# Patient Record
Sex: MALE | Race: WHITE | NOT HISPANIC OR LATINO | Employment: OTHER | URBAN - METROPOLITAN AREA
[De-identification: names, ages, dates, MRNs, and addresses within clinical notes are randomized per-mention and may not be internally consistent; named-entity substitution may affect disease eponyms.]

---

## 2017-01-05 ENCOUNTER — ALLSCRIPTS OFFICE VISIT (OUTPATIENT)
Dept: OTHER | Facility: OTHER | Age: 67
End: 2017-01-05

## 2017-07-06 ENCOUNTER — ALLSCRIPTS OFFICE VISIT (OUTPATIENT)
Dept: OTHER | Facility: OTHER | Age: 67
End: 2017-07-06

## 2017-12-21 ENCOUNTER — ALLSCRIPTS OFFICE VISIT (OUTPATIENT)
Dept: OTHER | Facility: OTHER | Age: 67
End: 2017-12-21

## 2017-12-21 DIAGNOSIS — I25.10 ATHEROSCLEROTIC HEART DISEASE OF NATIVE CORONARY ARTERY WITHOUT ANGINA PECTORIS: ICD-10-CM

## 2017-12-22 NOTE — PROGRESS NOTES
Assessment   Assessed    1  Atherosclerosis of native coronary artery without angina pectoris (414 01) (I25 10)   2  Benign essential hypertension (401 1) (I10)   3  Hypercholesterolemia (272 0) (E78 00)   4  Tobacco use (305 1) (Z72 0)   5  Heart murmur previously undiagnosed (785 2) (R01 1)    Plan   Atherosclerosis of native coronary artery without angina pectoris    · ECHO COMPLETE WITH CONTRAST IF INDICATED; Status:Hold For - Scheduling;    Requested for:06Rfh1356;    Perform:[facility] Radiology; Due:54Lvl7647; Ordered; For:Atherosclerosis of native coronary artery without angina pectoris; Ordered By:Sakshi datapine;  Atherosclerosis of native coronary artery without angina pectoris, Heart murmur    previously undiagnosed    · Follow-up visit in 6 months Evaluation and Treatment  Follow-up  Status: Hold For -    Scheduling  Requested for: 20Ghi7345   Ordered; For: Atherosclerosis of native coronary artery without angina pectoris, Heart murmur previously undiagnosed; Ordered By: Arlet Morejon Performed:  Due: 70GBW4596    Discussion/Summary   Cardiology Discussion Summary Free Text Note Form  [place]:    Patient is cardiovascularly stable  Maintained on amlodipine, ASA, benazepril, diltiazem, rosuvastatin  Will order ECHO to assess EF and regional wall motion abnormalities as patient has CAD and a murmur on exam  Next visit, will consider stress test  F/u with PCP  f/u with Dr Omega Mclaughlin in 6 months  All questions answered  studies reviewed with patient, medications reviewed and possible side effects discussed  Continue risk factor modification  All questions answered  Safety measures reviewed  Patient advised to report any problems prompting medical attention  Return for follow up visit in 6 months or earlier if needed with multiple medical problems who seems to be doing reasonably well from cardiac standpoint  Previous studies reviewed with patient  Medications reviewed and possible side effects discussed  concepts of cardiovascular disease , signs and symptoms of heart disease  Dietary and risk factor modification reinforced  All questions answered  Safety measures reviewed  Patient advised to report any problems prompting medical attention  Goals and Barriers: The patient has the current Goals: Stop smoking, medical management, ECHO  The patent has the current Barriers: None  Patient's Capacity to Self-Care: Patient is able to Self-Care  Patient Education: Educational resources provided: Stop smoking  Medication SE Review and Pt Understands Tx: Possible side effects of new medications were reviewed with the patient/guardian today  The treatment plan was reviewed with the patient/guardian  The patient/guardian understands and agrees with the treatment plan             Counseling Documentation With Imm: The patient was counseled regarding diagnostic results,-- instructions for management,-- risk factor reductions,-- prognosis,-- patient and family education,-- risks and benefits of treatment options,-- importance of compliance with treatment  Chief Complaint   Chief Complaint Free Text Note Form: followup visit    Chief Complaint Chronic Condition [facility]: Patient is here today for follow up of chronic conditions described in HPI  History of Present Illness   Cardiology [facility] Free Text Note Form [facility]: Patient with history of CAD, HTN, HLD, tobacco abuse presents for follow up visit  He is feeling well  Denies chest pain, dizziness, lightheadedness, SOB, palpitations  He is compliant on all medications  He still smokes  Review of Systems   Cardiology Male ROS:         Cardiac: No complaints of chest pain, no palpitations, no fainiting -- and-- as noted in HPI  Skin: No complaints of nonhealing sores or skin rash        Genitourinary: No complaints of recurrent urinary tract infections, frequent urination at night, difficult urination, blood in urine, kidney stones, loss of bladder control, no kidney or prostate problems, no erectile dysfunction  Psychological: No complaints of feeling depressed, anxiety, panic attacks, or difficulty concentrating  General: No complaints of trouble sleeping, lack of energy, fatigue, appetite changes, weight changes, fever, frequent infections, or night sweats  Respiratory: No complaints of shortness of breath, cough with sputum, or wheezing  HEENT: No complaints of serious problems, hearing problems, nose problems, throat problems, or snoring  Gastrointestinal: No complaints of liver problems, nausea, vomiting, heartburn, constipation, bloody stools, diarrhea, problems swallowing, adbominal pain, or rectal bleeding  Hematologic: No complaints of bleeding disorders, anemia, blood clots, or excessive brusing  Neurological: No complaints of numbness, tingling, dizziness, weakness, seizures, headaches, syncope or fainting, AM fatigue, daytime sleepiness, no witnessed apnea episodes  Musculoskeletal: No complaints of arthritis, back pain, or painfull swelling  ROS Reviewed:    ROS reviewed  Active Problems   Problems    1  Atherosclerosis of native coronary artery without angina pectoris (414 01) (I25 10)   2  Benign essential hypertension (401 1) (I10)   3  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 (402 11,428 0)     (I11 0,I50 9)   4  Chronic obstructive pulmonary disease (496) (J44 9)   5  Dyspnea on exertion (786 09) (R06 09)   6  Hypercholesterolemia (272 0) (E78 00)   7  Hyperlipidemia (272 4) (E78 5)   8  Muscle cramps (729 82) (R25 2)   9  Tobacco use (305 1) (Z72 0)    Past Medical History   Problems    1  History of Asbestosis (0681 660 48 54) (Ruthann Alves)   2  History of shortness of breath (V13 89) (H48 399)   3  Old myocardial infarction (412) (I25 2)   4  History of Pneumonia (V12 61)  Active Problems And Past Medical History Reviewed:     The active problems and past medical history were reviewed and updated today  Surgical History   Problems    1  History of Cardiac Cath Procedure Outcome: Successful   2  History of Cath Stent 1 Assessment   3  History of Cath Stent Placement   4  History of PTCA  Surgical History Reviewed: The surgical history was reviewed and updated today  Family History   Family History    1  No pertinent family history  Family History Reviewed: The family history was reviewed and updated today  Social History   Problems    · Denied: History of Alcohol Use (History)   · Daily Coffee Consumption - Eight Or More Cups A Day   · Occupation: Retired   · Smoking Cigarettes - Heavy (20-25 / Day)   · Tobacco use (305 1) (Z72 0)  Social History Reviewed: The social history was reviewed and updated today  The social history was reviewed and is unchanged  Current Meds    1  AmLODIPine Besylate 10 MG Oral Tablet; TAKE 1 TABLET EVERY MORNING; Therapy: 35Onr9178 to Recorded   2  Medina Aspirin EC Low Dose 81 MG Oral Tablet Delayed Release; TAKE 1 TABLET DAILY; Therapy: 61JBL0786 to (Evaluate:52Vkp5897); Last Rx:05Jan2017 Ordered   3  Benazepril HCl - 40 MG Oral Tablet; TAKE 1 TABLET DAILY; Therapy: 55TWV3993 to (Mahamed Velazquez)  Requested for: 48TRY8766; Last     Rx:30Mar2015 Ordered   4  Combivent Respimat  MCG/ACT Inhalation Aerosol Solution Recorded   5  DilTIAZem HCl ER Coated Beads 300 MG Oral Capsule Extended Release 24 Hour;     TAKE 1 CAPSULE DAILY; Therapy: 75DJC8739 to Recorded   6  Rosuvastatin Calcium 20 MG Oral Tablet; TAKE 1 TABLET DAILY; Therapy: 82BGU1312 to (Evaluate:23Dxn3945); Last Rx:05Jan2017 Ordered  Medication List Reviewed: The medication list was reviewed and updated today  Allergies   Medication    1  No Known Drug Allergies  Non-Medication    2   Seasonal    Vitals   Vital Signs    Recorded: 21Dec2017 08:56AM   Heart Rate 70   Systolic 425   Diastolic 72   Height 6 ft 2 in   Weight 239 lb    BMI Calculated 30 69   BSA Calculated 2 34   O2 Saturation 91     Physical Exam        Constitutional      General appearance: No acute distress, well appearing and well nourished  Eyes      Conjunctiva and Sclera examination: Conjunctiva pink, sclera anicteric  Ears, Nose, Mouth, and Throat - Oropharynx: Clear, nares are clear, mucous membranes are moist       Neck      Neck and thyroid: Normal, supple, trachea midline, no thyromegaly  Pulmonary      Respiratory effort: No increased work of breathing or signs of respiratory distress  Auscultation of lungs: Clear to auscultation, no rales, no rhonchi, no wheezing, good air movement  Cardiovascular      Auscultation of heart: Abnormal  -- 2/6 systolic ejection murmur right sternal border  Carotid pulses: Normal, 2+ bilaterally  Peripheral vascular exam: Normal pulses throughout, no tenderness, erythema or swelling  Pedal pulses: Normal, 2+ bilaterally  Examination of extremities for edema and/or varicosities: Normal        Abdomen      Abdomen: Non-tender and no distention  Liver and spleen: No hepatomegaly or splenomegaly  Musculoskeletal Gait and station: Normal gait  -- Digits and nails: Normal without clubbing or cyanosis  -- Inspection/palpation of joints, bones, and muscles: Normal, ROM normal        Skin - Skin and subcutaneous tissue: Normal without rashes or lesions  Skin is warm and well perfused, normal turgor  Neurologic - Cranial nerves: II - XII intact  -- Speech: Normal        Psychiatric - Orientation to person, place, and time: Normal -- Mood and affect: Normal       Attending Note   Collaborating Physician Note: Collaborating Physician: I interviewed and examined the patient,-- I supervised the Advanced Practitioner-- and-- I agree with the Advanced Practitioner note        Signatures    Electronically signed by : Omar Chawla [facility]; Dec 21 2017  9:20AM EST (Author)     Electronically signed by : VITALY De Luna ; Dec 21 2017 10:15AM EST                       (Author)

## 2018-01-12 VITALS
SYSTOLIC BLOOD PRESSURE: 146 MMHG | HEIGHT: 74 IN | HEART RATE: 60 BPM | WEIGHT: 237 LBS | OXYGEN SATURATION: 92 % | DIASTOLIC BLOOD PRESSURE: 86 MMHG | BODY MASS INDEX: 30.42 KG/M2

## 2018-01-12 VITALS
BODY MASS INDEX: 30.42 KG/M2 | DIASTOLIC BLOOD PRESSURE: 74 MMHG | HEART RATE: 66 BPM | SYSTOLIC BLOOD PRESSURE: 124 MMHG | HEIGHT: 74 IN | WEIGHT: 237 LBS | OXYGEN SATURATION: 92 %

## 2018-01-15 NOTE — RESULT NOTES
Verified Results  NM MYOCARDIAL PERFUSION SPECT (RX STRESS AND/OR REST) [id number] 07:46AM Breonna He     Test Name Result Flag Reference   NM MYOCARDIAL PERFUSION SPECT (RX STRESS AND/OR REST) (Report)     [facility] [address], [address] [place] [address]   [phone]     Rest/Stress Gated SPECT Myocardial Perfusion Imaging After Regadenoson     Patient: Milo Allen   MR number: [id number]   Account number: [de-identified]   : 1950   Age: 77 years   Gender: Male   Status: Outpatient   Location: [facility]   Height: 74 in   Weight: 237 lb   BP: 150/ 76 mmHg     Allergies: ALLERGIES NOT ON FILE     Diagnosis: R06 02 - Shortness of breath     Primary Physician: Martita Wild MD   Referring Physician: Ally Marie MD   Technician: Tedi Cogan   Group: [facility]   Interpreting Physician: Ally Marie MD     INDICATIONS: Shortness of Breath, PEREZ, CAD     HISTORY: The patient is a 77year old  male  Chest pain status: no   chest pain  Other symptoms: dyspnea  Coronary artery disease risk factors:   dyslipidemia, hypertension, and smoking  Cardiovascular history: coronary   artery disease and prior myocardial infarction  Prior cardiovascular   procedures: percutaneous transluminal coronary angioplasty  Medications: a   calcium channel blocker, aspirin, a lipid lowering agent, and an   antihypertensive agent  Previous test results: abnormal nuclear study  REST ECG: Normal sinus rhythm  Sinus bradycardia  Nonspecific ST and T wave   abnormalities were present  PROCEDURE: The study was performed at [address] [place] [address]  A   regadenoson infusion pharmacologic stress test was performed  Gated SPECT   myocardial perfusion imaging was performed after stress and at rest  Systolic   blood pressure was 150 mmHg, at the start of the study  Diastolic blood   pressure was 76 mmHg, at the start of the study  The heart rate was 59 bpm, at   the start of the study  Regadenoson protocol:   HR bpm SBP mmHg DBP mmHg Rhythm/conduct   Baseline 59 150 76 occasional PVC's   Immediate 67 -- -- --   1 min 73 164 70 --   2 min 67 -- -- --   3 min 67 -- -- --   4 min 65 150 70 --     STRESS SUMMARY: Duration of pharmacologic stress was 5 min  Maximal heart rate   during stress was 73 bpm  The rate-pressure product for the peak heart rate and   blood pressure was 63865  There was no chest pain during stress  The stress   test was terminated due to protocol completion  The stress ECG was negative for   ischemia  Arrhythmia during stress: isolated premature ventricular beats  ISOTOPE ADMINISTRATION:   Resting isotope administration Stress isotope administration   Agent Tetrofosmin Tetrofosmin   Dose 9 77 mCi 31 17 mCi   Date 02/02/2016 02/02/2016   Injection time 07:19 09:25   Injection-image interval 45 min 30 min     MYOCARDIAL PERFUSION IMAGING:   The image quality was fair  Rotating projection images reveal mild   diaphragmatic attenuation  Left ventricular size was normal      GATED SPECT:   The calculated left ventricular ejection fraction was 57 %  UWA=028   ESV=50   TID=1 59 There was no left ventricular regional abnormality  SUMMARY:   - Procedure information: 30 min   - Stress results: There was no chest pain during stress  - ECG conclusions: The stress ECG was negative for ischemia    - Gated SPECT: The calculated left ventricular ejection fraction was 57 %  BIT=054   ESV=50   TID=1 59 There was no left ventricular regional abnormality  IMPRESSIONS: No evidence of ischemia  Small fixed inferior defect likely c/w   diaphragmatic attenuation   Left ventricular systolic function was normal      Prepared and signed by     Hugo Cary MD   Signed 02/03/2016 15:02:06

## 2018-01-23 VITALS
DIASTOLIC BLOOD PRESSURE: 72 MMHG | WEIGHT: 239 LBS | OXYGEN SATURATION: 91 % | HEART RATE: 70 BPM | HEIGHT: 74 IN | BODY MASS INDEX: 30.67 KG/M2 | SYSTOLIC BLOOD PRESSURE: 130 MMHG

## 2018-06-14 RX ORDER — ATORVASTATIN CALCIUM 20 MG/1
20 TABLET, FILM COATED ORAL DAILY
COMMUNITY
Start: 2017-12-11 | End: 2021-04-15 | Stop reason: SDUPTHER

## 2018-06-14 RX ORDER — AMLODIPINE BESYLATE 10 MG/1
10 TABLET ORAL DAILY
COMMUNITY
Start: 2017-12-11 | End: 2021-04-15 | Stop reason: SDUPTHER

## 2018-06-14 RX ORDER — MOMETASONE FUROATE 1 MG/G
CREAM TOPICAL
COMMUNITY
Start: 2017-12-11 | End: 2019-12-06

## 2018-06-14 RX ORDER — DILTIAZEM HYDROCHLORIDE 300 MG/1
300 CAPSULE, COATED, EXTENDED RELEASE ORAL DAILY
COMMUNITY
Start: 2017-12-11 | End: 2021-04-15 | Stop reason: SDUPTHER

## 2018-06-14 RX ORDER — BENAZEPRIL HYDROCHLORIDE 40 MG/1
20 TABLET, FILM COATED ORAL DAILY
COMMUNITY
Start: 2014-04-16 | End: 2021-04-15 | Stop reason: SDUPTHER

## 2018-06-14 RX ORDER — ALBUTEROL SULFATE 90 UG/1
2 AEROSOL, METERED RESPIRATORY (INHALATION) EVERY 4 HOURS
COMMUNITY
Start: 2017-12-11 | End: 2020-07-24

## 2018-06-14 RX ORDER — ASPIRIN 81 MG
1 TABLET, DELAYED RELEASE (ENTERIC COATED) ORAL DAILY
COMMUNITY
End: 2021-04-02 | Stop reason: ALTCHOICE

## 2018-06-14 RX ORDER — ROSUVASTATIN CALCIUM 20 MG/1
1 TABLET, COATED ORAL DAILY
COMMUNITY
Start: 2017-01-05 | End: 2019-12-06

## 2018-07-02 ENCOUNTER — HOSPITAL ENCOUNTER (OUTPATIENT)
Dept: NON INVASIVE DIAGNOSTICS | Facility: CLINIC | Age: 68
Discharge: HOME | End: 2018-07-02
Payer: MEDICARE

## 2018-07-02 DIAGNOSIS — I25.10 ATHEROSCLEROTIC HEART DISEASE OF NATIVE CORONARY ARTERY WITHOUT ANGINA PECTORIS: ICD-10-CM

## 2018-07-02 PROCEDURE — 93306 TTE W/DOPPLER COMPLETE: CPT

## 2018-07-02 PROCEDURE — 93306 TTE W/DOPPLER COMPLETE: CPT | Performed by: INTERNAL MEDICINE

## 2018-07-03 ENCOUNTER — TELEPHONE (OUTPATIENT)
Dept: CARDIOLOGY CLINIC | Facility: CLINIC | Age: 68
End: 2018-07-03

## 2018-07-05 ENCOUNTER — OFFICE VISIT (OUTPATIENT)
Dept: CARDIOLOGY CLINIC | Facility: CLINIC | Age: 68
End: 2018-07-05
Payer: MEDICARE

## 2018-07-05 ENCOUNTER — TELEPHONE (OUTPATIENT)
Dept: CARDIOLOGY CLINIC | Facility: CLINIC | Age: 68
End: 2018-07-05

## 2018-07-05 VITALS
HEART RATE: 76 BPM | WEIGHT: 232 LBS | DIASTOLIC BLOOD PRESSURE: 78 MMHG | BODY MASS INDEX: 28.85 KG/M2 | OXYGEN SATURATION: 94 % | HEIGHT: 75 IN | SYSTOLIC BLOOD PRESSURE: 142 MMHG

## 2018-07-05 DIAGNOSIS — I10 HYPERTENSION, UNSPECIFIED TYPE: ICD-10-CM

## 2018-07-05 DIAGNOSIS — Z72.0 TOBACCO USE: ICD-10-CM

## 2018-07-05 DIAGNOSIS — E78.5 HYPERLIPIDEMIA, UNSPECIFIED HYPERLIPIDEMIA TYPE: ICD-10-CM

## 2018-07-05 DIAGNOSIS — R01.1 MURMUR: ICD-10-CM

## 2018-07-05 DIAGNOSIS — I25.119 CORONARY ARTERY DISEASE WITH ANGINA PECTORIS, UNSPECIFIED VESSEL OR LESION TYPE, UNSPECIFIED WHETHER NATIVE OR TRANSPLANTED HEART (HCC): Primary | ICD-10-CM

## 2018-07-05 PROCEDURE — 99213 OFFICE O/P EST LOW 20 MIN: CPT | Performed by: INTERNAL MEDICINE

## 2018-07-05 NOTE — TELEPHONE ENCOUNTER
Labs received from [facility] done 6/5, per Dr Mil Lantigua LDL is slightly elevated at 118 but otherwise bw is ok   I tried to call home x2 but no answer or vm, left detailed message on cell-advised to cb if any questions-MS

## 2018-07-05 NOTE — PROGRESS NOTES
[facility]  [address] [address]  Cardiology Consultation     Katey Stanford  [id number]  1950      1  Coronary artery disease with angina pectoris, unspecified vessel or lesion type, unspecified whether native or transplanted heart ([facility] [address] )     2  Hypertension, unspecified type     3  Hyperlipidemia, unspecified hyperlipidemia type     4  Tobacco use     5  Murmur         Chief Complaint   Patient presents with    Follow-up       HPI:  Patient with history of CAD, HTN, HLD, tobacco use presents for follow up visit  Denies chest pain, SOB, lightheadedness, palpitations, leg swelling, syncope  Compliant on medications  Continues to smoke  There is no problem list on file for this patient  Past Medical History:   Diagnosis Date    [facility]     Heart murmur     HTN (hypertension)     Hyperlipidemia     Myocardial infarction (HCC)     SOB (shortness of breath)      Social History     Social History    Marital status:      Spouse name: N/A    Number of children: N/A    Years of education: N/A     Occupational History    Not on file       Social History Main Topics    Smoking status: Current Every Day Smoker    Smokeless tobacco: Never Used    Alcohol use No    Drug use: Unknown    Sexual activity: Not on file     Other Topics Concern    Not on file     Social History Narrative    No narrative on file      Family History   Problem Relation Age of Onset    Family history unknown: Yes     Past Surgical History:   Procedure Laterality Date    CARDIAC CATHETERIZATION      CORONARY ANGIOPLASTY         Current Outpatient Prescriptions:     albuterol (PROVENTIL HFA,VENTOLIN HFA) 90 mcg/act inhaler, Inhale 2 puffs every 4 (four) hours, Disp: , Rfl:     amLODIPine (NORVASC) 10 mg tablet, Take 10 mg by mouth, Disp: , Rfl:     aspirin (ASPIR-LOW) 81 mg EC tablet, Take 1 tablet by mouth, Disp: , Rfl:     atorvastatin (LIPITOR) 20 mg tablet, Take 20 mg by mouth, Disp: , Rfl:     benazepril (LOTENSIN) 40 MG tablet, Take 20 mg by mouth daily  , Disp: , Rfl:     diltiazem (CARTIA XT) 300 mg 24 hr capsule, Take 300 mg by mouth daily  , Disp: , Rfl:     ipratropium-albuterol (COMBIVENT RESPIMAT) inhaler, Inhale, Disp: , Rfl:     mometasone (ELOCON) 0 1 % cream, Apply topically, Disp: , Rfl:     rosuvastatin (CRESTOR) 20 MG tablet, Take 1 tablet by mouth daily, Disp: , Rfl:   Allergies   Allergen Reactions    Pollen Extract      Vitals:    07/05/18 1425   BP: 142/78   Pulse: 76   SpO2: 94%   Weight: 105 kg (232 lb)   Height: 6' 3" (1 905 m)       Labs:  No visits with results within 2 Month(s) from this visit  Latest known visit with results is:   No results found for any previous visit  Imaging: No results found  Review of Systems:  Review of Systems   Constitutional: Negative for diaphoresis, fatigue and fever  HENT: Negative for congestion, ear discharge, hearing loss, sinus pain and sore throat  Eyes: Negative for pain, redness and visual disturbance  Respiratory: Negative for cough, chest tightness, shortness of breath and wheezing  Cardiovascular: Negative for chest pain, palpitations and leg swelling  Gastrointestinal: Negative for abdominal distention, abdominal pain, constipation, diarrhea, nausea and vomiting  Endocrine: Negative for cold intolerance and heat intolerance  Genitourinary: Negative for difficulty urinating and hematuria  Musculoskeletal: Negative for arthralgias, back pain, gait problem, joint swelling, myalgias, neck pain and neck stiffness  Skin: Negative for color change, pallor, rash and wound  Neurological: Negative for dizziness, syncope, weakness, numbness and headaches  Hematological: Does not bruise/bleed easily  Psychiatric/Behavioral: Negative for agitation, behavioral problems and confusion  The patient is not nervous/anxious          /78   Pulse 76   Ht 6' 3" (1 905 m)   Wt 105 kg (232 lb)   SpO2 94%   BMI 29 00 kg/m²     Physical Exam:  Physical Exam   Constitutional: He is oriented to person, place, and time  He appears well-developed and well-nourished  HENT:   Head: Normocephalic and atraumatic  Eyes: Conjunctivae and EOM are normal  Pupils are equal, round, and reactive to light  Neck: Normal range of motion  Neck supple  Cardiovascular: Normal rate, regular rhythm, normal heart sounds and intact distal pulses  Exam reveals no gallop and no friction rub  No murmur heard  Pulmonary/Chest: Effort normal  No respiratory distress  He has wheezes  He has no rales  He exhibits no tenderness  Abdominal: Soft  Bowel sounds are normal  He exhibits no distension  There is no rebound  Musculoskeletal: Normal range of motion  He exhibits no edema  Neurological: He is alert and oriented to person, place, and time  He has normal reflexes  Skin: Skin is warm and dry  Psychiatric: He has a normal mood and affect  His behavior is normal  Judgment and thought content normal        Discussion/Summary:  1  CAD: No anginal symptoms  Continue ASA, statin,  No BB due to wheezing  Will obtain recent blood work  2  HTN: Stable, continue present regimen    3  HLD: Continue statin  Will obtain recent blood work      4  Murmur, aortic stenosis:  -ECHO shows EF 60%, no regional wall motion abnormalities, mild AS  -Will need yearly ECHOs    F/u 6 months

## 2018-09-17 ENCOUNTER — TELEPHONE (OUTPATIENT)
Dept: CARDIOLOGY CLINIC | Facility: CLINIC | Age: 68
End: 2018-09-17

## 2018-09-17 DIAGNOSIS — J30.2 SEASONAL ALLERGIC RHINITIS, UNSPECIFIED TRIGGER: Primary | ICD-10-CM

## 2018-09-17 RX ORDER — PREDNISONE 10 MG/1
10 TABLET ORAL DAILY
Qty: 15 TABLET | Refills: 0 | Status: SHIPPED | OUTPATIENT
Start: 2018-09-17 | End: 2018-12-21 | Stop reason: ALTCHOICE

## 2018-09-17 NOTE — TELEPHONE ENCOUNTER
Patient walked into [facility]  Said Dr Winsome Valdes is away and he needs his Prednisone refilled  He needs for 10mg  Said he needs it b/c he has had a sinus infection for 11 days    I told him I would ask Dr Trista Ding   Call him [phone]

## 2018-12-11 ENCOUNTER — TELEPHONE (OUTPATIENT)
Dept: CARDIOLOGY CLINIC | Facility: CLINIC | Age: 68
End: 2018-12-11

## 2018-12-11 ENCOUNTER — APPOINTMENT (EMERGENCY)
Dept: RADIOLOGY | Facility: HOSPITAL | Age: 68
End: 2018-12-11
Payer: MEDICARE

## 2018-12-11 ENCOUNTER — HOSPITAL ENCOUNTER (EMERGENCY)
Facility: HOSPITAL | Age: 68
Discharge: HOME | End: 2018-12-11
Attending: EMERGENCY MEDICINE | Admitting: EMERGENCY MEDICINE
Payer: MEDICARE

## 2018-12-11 ENCOUNTER — APPOINTMENT (EMERGENCY)
Dept: CT IMAGING | Facility: HOSPITAL | Age: 68
End: 2018-12-11
Payer: MEDICARE

## 2018-12-11 VITALS
BODY MASS INDEX: 30.15 KG/M2 | TEMPERATURE: 98 F | DIASTOLIC BLOOD PRESSURE: 93 MMHG | WEIGHT: 242.51 LBS | OXYGEN SATURATION: 90 % | HEIGHT: 75 IN | HEART RATE: 72 BPM | RESPIRATION RATE: 17 BRPM | SYSTOLIC BLOOD PRESSURE: 189 MMHG

## 2018-12-11 DIAGNOSIS — R07.9 CHEST PAIN: Primary | ICD-10-CM

## 2018-12-11 DIAGNOSIS — J44.1 COPD EXACERBATION (HCC): ICD-10-CM

## 2018-12-11 LAB
ALBUMIN SERPL BCP-MCNC: 3.7 G/DL (ref 3.5–5)
ALP SERPL-CCNC: 109 U/L (ref 46–116)
ALT SERPL W P-5'-P-CCNC: 23 U/L (ref 12–78)
ANION GAP SERPL CALCULATED.3IONS-SCNC: 10 MMOL/L (ref 4–13)
AST SERPL W P-5'-P-CCNC: 14 U/L (ref 5–45)
ATRIAL RATE: 72 BPM
ATRIAL RATE: 79 BPM
BASOPHILS # BLD AUTO: 0.05 THOUSANDS/ΜL (ref 0–0.1)
BASOPHILS NFR BLD AUTO: 1 % (ref 0–1)
BILIRUB SERPL-MCNC: 0.8 MG/DL (ref 0.2–1)
BUN SERPL-MCNC: 15 MG/DL (ref 5–25)
CALCIUM SERPL-MCNC: 9.5 MG/DL (ref 8.3–10.1)
CHLORIDE SERPL-SCNC: 107 MMOL/L (ref 100–108)
CO2 SERPL-SCNC: 25 MMOL/L (ref 21–32)
CREAT SERPL-MCNC: 1.13 MG/DL (ref 0.6–1.3)
EOSINOPHIL # BLD AUTO: 0.4 THOUSAND/ΜL (ref 0–0.61)
EOSINOPHIL NFR BLD AUTO: 5 % (ref 0–6)
ERYTHROCYTE [DISTWIDTH] IN BLOOD BY AUTOMATED COUNT: 13.3 % (ref 11.6–15.1)
GFR SERPL CREATININE-BSD FRML MDRD: 65 ML/MIN/1.73SQ M
GLUCOSE SERPL-MCNC: 107 MG/DL (ref 65–140)
HCT VFR BLD AUTO: 47.6 % (ref 36.5–49.3)
HGB BLD-MCNC: 15.7 G/DL (ref 12–17)
IMM GRANULOCYTES # BLD AUTO: 0.02 THOUSAND/UL (ref 0–0.2)
IMM GRANULOCYTES NFR BLD AUTO: 0 % (ref 0–2)
LYMPHOCYTES # BLD AUTO: 1.62 THOUSANDS/ΜL (ref 0.6–4.47)
LYMPHOCYTES NFR BLD AUTO: 19 % (ref 14–44)
MCH RBC QN AUTO: 32.5 PG (ref 26.8–34.3)
MCHC RBC AUTO-ENTMCNC: 33 G/DL (ref 31.4–37.4)
MCV RBC AUTO: 99 FL (ref 82–98)
MONOCYTES # BLD AUTO: 0.64 THOUSAND/ΜL (ref 0.17–1.22)
MONOCYTES NFR BLD AUTO: 8 % (ref 4–12)
NEUTROPHILS # BLD AUTO: 5.66 THOUSANDS/ΜL (ref 1.85–7.62)
NEUTS SEG NFR BLD AUTO: 67 % (ref 43–75)
NRBC BLD AUTO-RTO: 0 /100 WBCS
NT-PROBNP SERPL-MCNC: 350 PG/ML
PLATELET # BLD AUTO: 217 THOUSANDS/UL (ref 149–390)
PMV BLD AUTO: 11.4 FL (ref 8.9–12.7)
POTASSIUM SERPL-SCNC: 3.8 MMOL/L (ref 3.5–5.3)
PR INTERVAL: 206 MS
PR INTERVAL: 210 MS
PROT SERPL-MCNC: 7.4 G/DL (ref 6.4–8.2)
QRS AXIS: 54 DEGREES
QRS AXIS: 65 DEGREES
QRSD INTERVAL: 112 MS
QRSD INTERVAL: 114 MS
QT INTERVAL: 386 MS
QT INTERVAL: 412 MS
QTC INTERVAL: 442 MS
QTC INTERVAL: 451 MS
RBC # BLD AUTO: 4.83 MILLION/UL (ref 3.88–5.62)
SODIUM SERPL-SCNC: 142 MMOL/L (ref 136–145)
T WAVE AXIS: -5 DEGREES
T WAVE AXIS: 18 DEGREES
TROPONIN I SERPL-MCNC: <0.02 NG/ML
TROPONIN I SERPL-MCNC: <0.02 NG/ML
VENTRICULAR RATE: 72 BPM
VENTRICULAR RATE: 79 BPM
WBC # BLD AUTO: 8.39 THOUSAND/UL (ref 4.31–10.16)

## 2018-12-11 PROCEDURE — 94640 AIRWAY INHALATION TREATMENT: CPT

## 2018-12-11 PROCEDURE — 36415 COLL VENOUS BLD VENIPUNCTURE: CPT | Performed by: EMERGENCY MEDICINE

## 2018-12-11 PROCEDURE — 93010 ELECTROCARDIOGRAM REPORT: CPT | Performed by: INTERNAL MEDICINE

## 2018-12-11 PROCEDURE — 70450 CT HEAD/BRAIN W/O DYE: CPT

## 2018-12-11 PROCEDURE — 83880 ASSAY OF NATRIURETIC PEPTIDE: CPT | Performed by: EMERGENCY MEDICINE

## 2018-12-11 PROCEDURE — 84484 ASSAY OF TROPONIN QUANT: CPT | Performed by: EMERGENCY MEDICINE

## 2018-12-11 PROCEDURE — 71046 X-RAY EXAM CHEST 2 VIEWS: CPT

## 2018-12-11 PROCEDURE — 93005 ELECTROCARDIOGRAM TRACING: CPT

## 2018-12-11 PROCEDURE — 99285 EMERGENCY DEPT VISIT HI MDM: CPT

## 2018-12-11 PROCEDURE — 85025 COMPLETE CBC W/AUTO DIFF WBC: CPT | Performed by: EMERGENCY MEDICINE

## 2018-12-11 PROCEDURE — 80053 COMPREHEN METABOLIC PANEL: CPT | Performed by: EMERGENCY MEDICINE

## 2018-12-11 RX ORDER — ALBUTEROL SULFATE 2.5 MG/3ML
5 SOLUTION RESPIRATORY (INHALATION) ONCE
Status: COMPLETED | OUTPATIENT
Start: 2018-12-11 | End: 2018-12-11

## 2018-12-11 RX ORDER — ASPIRIN 81 MG/1
324 TABLET, CHEWABLE ORAL ONCE
Status: DISCONTINUED | OUTPATIENT
Start: 2018-12-11 | End: 2018-12-11 | Stop reason: HOSPADM

## 2018-12-11 RX ORDER — AZITHROMYCIN 250 MG/1
TABLET, FILM COATED ORAL
Qty: 6 TABLET | Refills: 0 | Status: SHIPPED | OUTPATIENT
Start: 2018-12-11 | End: 2018-12-15

## 2018-12-11 RX ADMIN — IPRATROPIUM BROMIDE 0.5 MG: 0.5 SOLUTION RESPIRATORY (INHALATION) at 09:59

## 2018-12-11 RX ADMIN — ALBUTEROL SULFATE 5 MG: 2.5 SOLUTION RESPIRATORY (INHALATION) at 09:59

## 2018-12-11 NOTE — DISCHARGE INSTRUCTIONS
Chest Pain, Ambulatory Care   GENERAL INFORMATION:   Chest pain  can be caused by a range of conditions, from not serious to life-threatening  It may be caused by a heart attack or a blood clot in your lungs  Sometimes chest pain or pressure is caused by poor blood flow to your heart (angina)  Infection, inflammation, or a fracture in the bones or cartilage in your chest can cause pain or discomfort  Chest pain can also be a symptom of a digestive problem, such as acid reflux or a stomach ulcer  Common symptoms include the following:   · Fever or sweating     · Nausea or vomiting     · Shortness of breath     · Discomfort or pressure that spreads from your chest to your back, jaw, or arm     · A racing or slow heartbeat     · Feeling weak, tired, or faint  Seek immediate care for the following symptoms:   · Any of the following signs of a heart attack:      ¨ Squeezing, pressure, or pain in your chest that lasts longer than 5 minutes or returns    ¨ Discomfort or pain in your back, neck, jaw, stomach, or arm     ¨ Trouble breathing     ¨ Nausea or vomiting    ¨ Lightheadedness or a sudden cold sweat, especially with trouble breathing         · Chest discomfort that gets worse, even with medicine    · Coughing or vomiting blood    · Black or bloody bowel movements     · Vomiting that does not stop, or pain when you swallow  Treatment for chest pain  may include medicine to treat your symptoms while he determines the cause of your chest pain  You may also need any of the following:  · Antiplatelets , such as aspirin, help prevent blood clots  Take your antiplatelet medicine exactly as directed  These medicines make it more likely for you to bleed or bruise  If you are told to take aspirin, do not take acetaminophen or ibuprofen instead  · Prescription pain medicine  may be given  Ask how to take this medicine safely  Do not smoke: If you smoke, it is never too late to quit   Smoking increases your risk for a heart attack and other heart and lung conditions  Ask your healthcare provider for information about how to stop smoking if you need help  Follow up with your healthcare provider as directed: You may need more tests  You may be referred to a specialist, such as a cardiologist or gastroenterologist  Write down your questions so you remember to ask them during your visits  CARE AGREEMENT:   You have the right to help plan your care  Learn about your health condition and how it may be treated  Discuss treatment options with your caregivers to decide what care you want to receive  You always have the right to refuse treatment  The above information is an  only  It is not intended as medical advice for individual conditions or treatments  Talk to your doctor, nurse or pharmacist before following any medical regimen to see if it is safe and effective for you  © 2014 [address] is for End User's use only and may not be sold, redistributed or otherwise used for commercial purposes  All illustrations and images included in CareNotes® are the copyrighted property of [employer]  or [employer]  COPD (Chronic Obstructive Pulmonary Disease)   WHAT YOU NEED TO KNOW:   What is chronic obstructive pulmonary disease (COPD)? COPD is a lung disease that makes it hard for you to breathe  It is usually a result of lung damage caused by years of irritation and inflammation in your lungs  This limits air flow in your lungs  Smoking, pollution, genetics, or a history of lung infections can increase your risk for COPD  What are the signs and symptoms of COPD? · Shortness of breath     · A dry cough     · Coughing fits that bring up mucus from your lungs     · Wheezing and chest tightness  How is COPD diagnosed? Your healthcare provider will ask about your symptoms and examine you  He or she will ask if a family member has COPD or breathing problems   He or she will ask if you are a current or former smoker  Tell your provider if you have other medical conditions, such as heart disease or asthma  Tell him or her how long you have had symptoms, what makes them worse, and how they affect your life  You may need the following:  · Lung function tests  measure the airflow in your lungs and show how well you can breathe  · Blood tests  check for infection and measure oxygen levels in your blood  · A chest x-ray  is done to check for other lung problems  · CT scan pictures  may be taken of your lungs  You may be given contrast liquid to help your lungs show up better in the pictures  Tell the healthcare provider if you have ever had an allergic reaction to contrast liquid  How is COPD treated? · Medicines  may be used to open your airways, decrease swelling and inflammation in your lungs, or treat an infection  You may need 2 or more medicines  A short-acting medicine relieves symptoms quickly  Long-acting medicines will control or prevent symptoms  Ask your healthcare provider for more information about the medicines you are given and how to use them safely  · Pulmonary rehabilitation  is a program to help you manage your symptoms and improve your quality of life  It may include nutritional counseling and exercise to strengthen your lungs  · Oxygen  may help you breathe easier and feel more alert if you have severe COPD  · Surgery  is sometimes done if all other treatments have failed  A lung reduction is surgery to remove part of your damaged lung  A lung transplant is the replacement of your lung with a donor lung  Ask your healthcare provider for more information about surgery for COPD  What are the risks of COPD? COPD raises your risk for diabetes, high blood pressure, and heart disease  Without treatment, COPD can become life-threatening  What can I do to help make breathing easier?    · Use pursed-lip breathing any time you feel short of breath  Take a deep breath in through your nose  Slowly breathe out through your mouth with your lips pursed for twice as long as you inhaled  You can also practice this breathing pattern while you bend, lift, climb stairs, or exercise  It slows down your breathing and helps move more air in and out of your lungs  · Do not smoke, and avoid others who smoke  Nicotine and other substances can cause lung irritation or damage and make it harder for you to breathe  Do not use e-cigarettes or smokeless tobacco  They still contain nicotine  Ask your healthcare provider for information if you currently smoke and need help to quit  For support and more information:  ¨ [employer]  Phone: 5- 308 - 347-7153  Web Address: [place]see      · Be aware of and avoid anything that makes your symptoms worse  Stay out of high altitudes and places with high humidity  Stay inside, or cover your mouth and nose with a scarf when you are outside during cold weather  Stay inside on days when air pollution or pollen counts are high  Do not use aerosol sprays such as deodorant, bug spray, and hair spray  How can I manage COPD and help prevent exacerbations? COPD is a serious condition that gets worse over time  A COPD exacerbation means your symptoms suddenly get worse  It is important to prevent exacerbations  An exacerbation can cause more lung damage  COPD cannot be cured, but you can take action to feel better and prevent COPD exacerbations:  · Protect yourself from germs  Germs can get into your lungs and cause an infection  An infection in your lungs can create more mucus and make it harder to breathe  An infection can also create swelling in your airways and prevent air from getting in  You can decrease your risk for infection by doing the following:     [facility] your hands often with soap and water  Carry germ-killing gel with you   You can use the gel to clean your hands when soap and water are not available  ¨ Do not touch your eyes, nose, or mouth unless you have washed your hands first      ¨ Always cover your mouth when you cough  Cough into a tissue or your shirtsleeve so you do not spread germs from your hands  ¨ Try to avoid people who have a cold or the flu  If you are sick, stay away from others as much as possible  · Drink more liquids  This will help to keep your air passages moist and help you cough up mucus  Ask how much liquid to drink each day and which liquids are best for you  · Exercise daily  Exercise for at least 20 minutes each day to help increase your energy and decrease shortness of breath  Talk to your healthcare provider about the best exercise plan for you  · Ask about vaccines  Your healthcare provider may recommend that you get regular flu and pneumonia vaccines  Pneumonia can become life-threatening for a person who has COPD  Ask about other vaccines you may need  Ask your healthcare provider about the flu and pneumonia vaccines  All adults should get the flu (influenza) vaccine every year as soon as it becomes available  The pneumonia vaccine is given to adults aged 72 or older to prevent pneumococcal disease, such as pneumonia  Adults aged 23 to 59 years who are at high risk for pneumococcal disease also should get the pneumococcal vaccine  It may need to be repeated 1 or 5 years later  Call 911 if:   · You feel lightheaded, short of breath, and have chest pain  When should I seek immediate care? · You are confused, dizzy, or feel faint  · Your arm or leg feels warm, tender, and painful  It may look swollen and red  · You cough up blood  When should I contact my healthcare provider? · You have more shortness of breath than usual      · You need more medicine than usual to control your symptoms  · You are coughing or wheezing more than usual      · You are coughing up more mucus, or it is a different color or has a different odor  · You gain more than 3 pounds in a week  · You have a fever, a runny or stuffy nose, and a sore throat, or other cold or flu symptoms  · Your skin, lips, or nails start to turn blue  · You have swelling in your legs or ankles  · You are very tired or weak for more than a day  · You notice changes in your mood, or changes in your ability to think or concentrate  · You have questions or concerns about your condition or care  CARE AGREEMENT:   You have the right to help plan your care  Learn about your health condition and how it may be treated  Discuss treatment options with your caregivers to decide what care you want to receive  You always have the right to refuse treatment  The above information is an  only  It is not intended as medical advice for individual conditions or treatments  Talk to your doctor, nurse or pharmacist before following any medical regimen to see if it is safe and effective for you  © 2017 [address] Information is for End User's use only and may not be sold, redistributed or otherwise used for commercial purposes  All illustrations and images included in CareNotes® are the copyrighted property of [employer] M [employer]  or Олег Mercedes

## 2018-12-11 NOTE — TELEPHONE ENCOUNTER
Attempted to call again x2- rang twice and went to [facility] per Dr Vida Hines for pt to call our office to update us if he went to the hospital  He is to book office visit at [facility] this week is necessary

## 2018-12-11 NOTE — TELEPHONE ENCOUNTER
Please call the patient this afternoon to see if he did go to the emergency room  We can see him at [facility] [address] [facility] this week if necessary  Please let me know

## 2018-12-11 NOTE — ED PROVIDER NOTES
History  Chief Complaint   Patient presents with    Chest Pain     Patient stated that he started to have chest pressure since Saturday  on and off CP since then  hx of stent     77 y o  M presents w chest pressure/pain  He states that he started getting chest pressure 3 days ago, a/w intermittent chest pain  Some shortness of breath associated w the chest pressure  He is also wheezing  No nausea or vomiting, no fever/chills, no cough, no other complaints    Pt has hx of cardiac stent - states the the feeling were similar, but much more severity when he got his stent  Also pmhx asbestosis, HTN  Prior to Admission Medications   Prescriptions Last Dose Informant Patient Reported? Taking?    albuterol (PROVENTIL HFA,VENTOLIN HFA) 90 mcg/act inhaler  Self Yes No   Sig: Inhale 2 puffs every 4 (four) hours   amLODIPine (NORVASC) 10 mg tablet  Self Yes No   Sig: Take 10 mg by mouth   aspirin (ASPIR-LOW) 81 mg EC tablet  Self Yes No   Sig: Take 1 tablet by mouth   atorvastatin (LIPITOR) 20 mg tablet  Self Yes No   Sig: Take 20 mg by mouth   benazepril (LOTENSIN) 40 MG tablet  Self Yes No   Sig: Take 20 mg by mouth daily     diltiazem (CARTIA XT) 300 mg 24 hr capsule  Self Yes No   Sig: Take 300 mg by mouth daily     ipratropium-albuterol (COMBIVENT RESPIMAT) inhaler  Self Yes No   Sig: Inhale   mometasone (ELOCON) 0 1 % cream  Self Yes No   Sig: Apply topically   predniSONE 10 mg tablet   No No   Sig: Take 1 tablet (10 mg total) by mouth daily   rosuvastatin (CRESTOR) 20 MG tablet  Self Yes No   Sig: Take 1 tablet by mouth daily      Facility-Administered Medications: None       Past Medical History:   Diagnosis Date    Asbestosis ([address] )     Heart murmur     HTN (hypertension)     Hyperlipidemia     Myocardial infarction (HCC)     SOB (shortness of breath)        Past Surgical History:   Procedure Laterality Date    CARDIAC CATHETERIZATION      CORONARY ANGIOPLASTY         Family History   Problem Relation Age of Onset    Family history unknown: Yes     I have reviewed and agree with the history as documented  Social History   Substance Use Topics    Smoking status: Current Every Day Smoker    Smokeless tobacco: Never Used    Alcohol use No        Review of Systems   Constitutional: Negative for chills, diaphoresis, fatigue and fever  HENT: Negative for congestion and sore throat  Respiratory: Positive for cough, chest tightness, shortness of breath and wheezing  Negative for apnea  Cardiovascular: Negative for chest pain (Chest Pressure), palpitations and leg swelling  Gastrointestinal: Positive for nausea  Negative for abdominal pain, constipation, diarrhea and vomiting  Genitourinary: Negative for dysuria and flank pain  Musculoskeletal: Negative for back pain and neck pain  Skin: Negative for color change and rash  Allergic/Immunologic: Negative for immunocompromised state  Neurological: Negative for dizziness, syncope (near syncope), weakness, light-headedness, numbness and headaches  Psychiatric/Behavioral: Negative for confusion  Physical Exam  Physical Exam   Constitutional: He is oriented to person, place, and time  He appears well-developed and well-nourished  No distress  HENT:   Head: Normocephalic and atraumatic  Mouth/Throat: Oropharynx is clear and moist    Eyes: Pupils are equal, round, and reactive to light  Conjunctivae and EOM are normal  Right eye exhibits no discharge  Left eye exhibits no discharge  No scleral icterus  Neck: Normal range of motion  Neck supple  No JVD present  Cardiovascular: Normal rate, regular rhythm, normal heart sounds and intact distal pulses  Exam reveals no gallop and no friction rub  No murmur heard  Pulmonary/Chest: Effort normal  No respiratory distress  He has wheezes  He has no rales  He exhibits tenderness  Abdominal: Soft  Bowel sounds are normal  He exhibits no distension  There is no tenderness   There is no rebound and no guarding  Musculoskeletal: Normal range of motion  He exhibits no edema, tenderness or deformity  Neurological: He is alert and oriented to person, place, and time  No cranial nerve deficit  Skin: Skin is warm and dry  No rash noted  He is not diaphoretic  No erythema  No pallor  Psychiatric: He has a normal mood and affect  His behavior is normal    Vitals reviewed        Vital Signs  ED Triage Vitals   Temperature Pulse Respirations Blood Pressure SpO2   12/11/18 0907 12/11/18 0844 12/11/18 0844 12/11/18 0844 12/11/18 0844   98 °F (36 7 °C) 80 18 (!) 189/93 91 %      Temp src Heart Rate Source Patient Position - Orthostatic VS BP Location FiO2 (%)   -- 12/11/18 0844 12/11/18 0844 12/11/18 0844 --    Monitor Lying Right arm       Pain Score       --                  Vitals:    12/11/18 0844 12/11/18 1230   BP: (!) 189/93 (!) 189/93   Pulse: 80 72   Patient Position - Orthostatic VS: Lying        Visual Acuity      ED Medications  Medications   albuterol inhalation solution 5 mg (5 mg Nebulization Given 12/11/18 0959)   ipratropium (ATROVENT) 0 02 % inhalation solution 0 5 mg (0 5 mg Nebulization Given 12/11/18 0959)       Diagnostic Studies  Results Reviewed     Procedure Component Value Units Date/Time    Troponin I [[id number]]  (Normal) Collected:  12/11/18 1239    Lab Status:  Final result Specimen:  Blood from Arm, Right Updated:  12/11/18 1312     Troponin I <0 02 ng/mL     NT-BNP PRO [[id number]]  (Abnormal) Collected:  12/11/18 0853    Lab Status:  Final result Specimen:  Blood from Arm, Right Updated:  12/11/18 0925     NT-proBNP 350 (H) pg/mL     Troponin I [[id number]]  (Normal) Collected:  12/11/18 0853    Lab Status:  Final result Specimen:  Blood from Arm, Right Updated:  12/11/18 0922     Troponin I <0 02 ng/mL     Comprehensive metabolic panel [[id number]] Collected:  12/11/18 0853    Lab Status:  Final result Specimen:  Blood from Arm, Right Updated:  12/11/18 0917     Sodium 142 mmol/L      Potassium 3 8 mmol/L      Chloride 107 mmol/L      CO2 25 mmol/L      ANION GAP 10 mmol/L      BUN 15 mg/dL      Creatinine 1 13 mg/dL      Glucose 107 mg/dL      Calcium 9 5 mg/dL      AST 14 U/L      ALT 23 U/L      Alkaline Phosphatase 109 U/L      Total Protein 7 4 g/dL      Albumin 3 7 g/dL      Total Bilirubin 0 80 mg/dL      eGFR 65 ml/min/1 73sq m     Narrative:         National Kidney Disease Education Program recommendations are as follows:  GFR calculation is accurate only with a steady state creatinine  Chronic Kidney disease less than 60 ml/min/1 73 sq  meters  Kidney failure less than 15 ml/min/1 73 sq  meters  CBC and differential [[id number]]  (Abnormal) Collected:  12/11/18 0853    Lab Status:  Final result Specimen:  Blood from Arm, Right Updated:  12/11/18 0906     WBC 8 39 Thousand/uL      RBC 4 83 Million/uL      Hemoglobin 15 7 g/dL      Hematocrit 47 6 %      MCV 99 (H) fL      MCH 32 5 pg      MCHC 33 0 g/dL      RDW 13 3 %      MPV 11 4 fL      Platelets 722 Thousands/uL      nRBC 0 /100 WBCs      Neutrophils Relative 67 %      Immat GRANS % 0 %      Lymphocytes Relative 19 %      Monocytes Relative 8 %      Eosinophils Relative 5 %      Basophils Relative 1 %      Neutrophils Absolute 5 66 Thousands/µL      Immature Grans Absolute 0 02 Thousand/uL      Lymphocytes Absolute 1 62 Thousands/µL      Monocytes Absolute 0 64 Thousand/µL      Eosinophils Absolute 0 40 Thousand/µL      Basophils Absolute 0 05 Thousands/µL                  CT head without contrast   ED Interpretation by Vicente Golden DO (12/11 1134)   No CT evidence of acute intracranial process        Chronic microangiopathy        Moderate chronic paranasal sinusitis  Final Result by Musa Anne MD (12/11 1014)      No CT evidence of acute intracranial process  Chronic microangiopathy  Moderate chronic paranasal sinusitis                    Workstation performed: [id number] X-ray chest 2 views   ED Interpretation by Maddie Thomas DO (12/11 1134)   COPD  No acute cardiopulmonary disease  Final Result by Desiree Sewell MD (12/11 1056)      COPD  No acute cardiopulmonary disease              Workstation performed: [id number]                    Procedures  ECG 12 Lead Documentation  Date/Time: 12/11/2018 8:47 AM  Performed by: Leda Pettit by: Carlos Romero     Indications / Diagnosis:  CP  ECG reviewed by me, the ED Provider: yes    Patient location:  ED  Previous ECG:     Previous ECG:  Compared to current    Comparison ECG info:  Dec 2013    Similarity:  No change    Comparison to cardiac monitor: Yes    Interpretation:     Interpretation: non-specific    Rate:     ECG rate:  79    ECG rate assessment: normal    Rhythm:     Rhythm: sinus rhythm    Ectopy:     Ectopy: none    QRS:     QRS axis:  Normal    QRS intervals:  Normal  Conduction:     Conduction: abnormal      Abnormal conduction: incomplete RBBB    ST segments:     ST segments:  Non-specific  T waves:     T waves: normal        ECG 12 Lead Documentation  Date/Time: 12/11/2018 1:06 PM  Performed by: Leda Pettit by: Carlos Romero     Indications / Diagnosis:  CP, repeat ekg  ECG reviewed by me, the ED Provider: yes    Patient location:  ED  Previous ECG:     Previous ECG:  Compared to current    Comparison ECG info:  Earlier today    Similarity:  No change    Comparison to cardiac monitor: Yes    Interpretation:     Interpretation: non-specific    Rate:     ECG rate:  72    ECG rate assessment: normal    Rhythm:     Rhythm: sinus rhythm    Ectopy:     Ectopy: none    QRS:     QRS axis:  Normal    QRS intervals:  Normal  Conduction:     Conduction: normal    ST segments:     ST segments:  Normal  T waves:     T waves: normal             Phone Contacts  ED Phone Contact    ED Course  ED Course as of Dec 18 0929   Tue Dec 11, 2018   0940 Troponin I: <0 02   1133 No baseline for comparison NT-proBNP: (!) 350   1157 Patient is feeling improved after breathing treatments  States the breathing treatments alleviated the pressure on his chest   Advised patient that it would be best for him to be admitted for continued cardiac observation however he refuses  Agrees with delta troponin, delta EKG  1323 Troponin I: <0 02   1330 Patient is offered admission multiple times that he should be admitted for cardiac work up because of his elevated risk for CAD, but patient refuses and wants to go home  He is discharged with good return precautions  HEART Risk Score      Most Recent Value   History  1 Filed at: 12/11/2018 1154   ECG  1 Filed at: 12/11/2018 1154   Age  2 Filed at: 12/11/2018 1154   Risk Factors  2 Filed at: 12/11/2018 1154   Troponin  0 Filed at: 12/11/2018 1154   Heart Score Risk Calculator   History  1 Filed at: 12/11/2018 1154   ECG  1 Filed at: 12/11/2018 1154   Age  2 Filed at: 12/11/2018 1154   Risk Factors  2 Filed at: 12/11/2018 1154   Troponin  0 Filed at: 12/11/2018 1154   HEART Score  6 Filed at: 12/11/2018 1154   HEART Score  6 Filed at: 12/11/2018 1154                            MDM  Number of Diagnoses or Management Options  Chest pain:   COPD exacerbation [facility]):   Diagnosis management comments: CP w HEART score of 6  ACS work up and advised admission, but patient refuses admission - agrees to delta trop/ekg which result negative  Patient does feel improved after breathing tx and likely there is some element of reactive airway dz  Patient is urged to stay for cardiac obs but he wants to go home  Discussed with patient and they understood the risks and benefits of discharge  Patient had opportunity to ask questions regarding care and discharge instructions and had no further questions  Advised follow up with PCP, advised returning if worsening, and discussed disease specific return precautions    Patient understood discharge instructions  Amount and/or Complexity of Data Reviewed  Clinical lab tests: ordered and reviewed  Tests in the radiology section of CPT®: ordered and reviewed      CritCare Time    Disposition  Final diagnoses:   Chest pain   COPD exacerbation ([address] )     Time reflects when diagnosis was documented in both MDM as applicable and the Disposition within this note     Time User Action Codes Description Comment    12/11/2018  1:23 PM Everette Jordan Add [R07 9] Chest pain     12/11/2018  1:23 PM SueEverette carroll Maurer Add [J44 1] COPD exacerbation [facility])       ED Disposition     ED Disposition Condition Comment    Discharge  Vassie Current discharge to home/self care      Condition at discharge: Good        Follow-up Information     Follow up With Specialties Details Why [facility] [address] Emergency Department Emergency Medicine  If symptoms worsen: chest pain,  fever/chills, passing out, etc [address] [facility] [address] ED, [address], [place], [address] [place] [address],[address], MD Internal Medicine Schedule an appointment as soon as possible for a visit For follow up to ensure improvement, and for further testing and treatment as needed [address] [place] [address]  [phone]       Cardiologist               Discharge Medication List as of 12/11/2018  1:27 PM      START taking these medications    Details   azithromycin (ZITHROMAX) 250 mg tablet Take 2 tablets today then 1 tablet daily x 4 days, Normal         CONTINUE these medications which have NOT CHANGED    Details   albuterol (PROVENTIL HFA,VENTOLIN HFA) 90 mcg/act inhaler Inhale 2 puffs every 4 (four) hours, Starting Mon 12/11/2017, Historical Med      amLODIPine (NORVASC) 10 mg tablet Take 10 mg by mouth, Starting Mon 12/11/2017, Historical Med      aspirin (ASPIR-LOW) 81 mg EC tablet Take 1 tablet by mouth, Historical Med      atorvastatin (LIPITOR) 20 mg tablet Take 20 mg by mouth, Starting Mon 12/11/2017, Historical Med      benazepril (LOTENSIN) 40 MG tablet Take 20 mg by mouth daily  , Starting Wed 4/16/2014, Historical Med      diltiazem (CARTIA XT) 300 mg 24 hr capsule Take 300 mg by mouth daily  , Starting Mon 12/11/2017, Historical Med      ipratropium-albuterol (COMBIVENT RESPIMAT) inhaler Inhale, Historical Med      mometasone (ELOCON) 0 1 % cream Apply topically, Starting Mon 12/11/2017, Until Tue 12/11/2018, Historical Med      predniSONE 10 mg tablet Take 1 tablet (10 mg total) by mouth daily, Starting Mon 9/17/2018, Normal      rosuvastatin (CRESTOR) 20 MG tablet Take 1 tablet by mouth daily, Starting Thu 1/5/2017, Historical Med             Outpatient Discharge Orders  Stress test only, exercise   Standing Status: Future  Standing Exp   Date: 12/11/22         ED Provider  Electronically Signed by           Prateek Kaba,   12/18/18 0978

## 2018-12-11 NOTE — TELEPHONE ENCOUNTER
JANETTE Gonzalez Agnes Gonzalez Patient came into [facility] this am   Goldie Hogan he thinks he had a heart attack on Saturday  I asked what happened and he said he almost passed out  He took his BP this am and he said both his #'s top and bottom were over 100 but couldn't tell me what they were    Could not tell me a lot of info as to what happened on Sat but he asked if he should go to the ER and I said yes

## 2018-12-12 NOTE — TELEPHONE ENCOUNTER
FYI patient did go to emergency room he stopped in office late yesterday afternoon, his AVS sheet said to follow up with PCP and they gave him an antibiotic to take   He has true on 12/21

## 2018-12-18 DIAGNOSIS — R07.89 CHEST DISCOMFORT: Primary | ICD-10-CM

## 2018-12-18 DIAGNOSIS — I25.119 CORONARY ARTERY DISEASE INVOLVING NATIVE CORONARY ARTERY OF NATIVE HEART WITH ANGINA PECTORIS (HCC): ICD-10-CM

## 2018-12-20 ENCOUNTER — HOSPITAL ENCOUNTER (OUTPATIENT)
Dept: NON INVASIVE DIAGNOSTICS | Facility: CLINIC | Age: 68
Discharge: HOME | End: 2018-12-20
Payer: MEDICARE

## 2018-12-20 DIAGNOSIS — I25.119 CORONARY ARTERY DISEASE INVOLVING NATIVE CORONARY ARTERY OF NATIVE HEART WITH ANGINA PECTORIS (HCC): ICD-10-CM

## 2018-12-20 DIAGNOSIS — R07.89 CHEST DISCOMFORT: ICD-10-CM

## 2018-12-20 PROCEDURE — 93018 CV STRESS TEST I&R ONLY: CPT | Performed by: INTERNAL MEDICINE

## 2018-12-20 PROCEDURE — A9502 TC99M TETROFOSMIN: HCPCS

## 2018-12-20 PROCEDURE — 78452 HT MUSCLE IMAGE SPECT MULT: CPT

## 2018-12-20 PROCEDURE — 78452 HT MUSCLE IMAGE SPECT MULT: CPT | Performed by: INTERNAL MEDICINE

## 2018-12-20 PROCEDURE — 93016 CV STRESS TEST SUPVJ ONLY: CPT | Performed by: INTERNAL MEDICINE

## 2018-12-20 PROCEDURE — 93017 CV STRESS TEST TRACING ONLY: CPT

## 2018-12-20 RX ADMIN — REGADENOSON 0.4 MG: 0.08 INJECTION, SOLUTION INTRAVENOUS at 09:11

## 2018-12-21 ENCOUNTER — OFFICE VISIT (OUTPATIENT)
Dept: CARDIOLOGY CLINIC | Facility: CLINIC | Age: 68
End: 2018-12-21
Payer: MEDICARE

## 2018-12-21 VITALS
OXYGEN SATURATION: 93 % | HEART RATE: 83 BPM | HEIGHT: 75 IN | BODY MASS INDEX: 29.67 KG/M2 | SYSTOLIC BLOOD PRESSURE: 160 MMHG | WEIGHT: 238.6 LBS | DIASTOLIC BLOOD PRESSURE: 82 MMHG

## 2018-12-21 DIAGNOSIS — I25.119 CORONARY ARTERY DISEASE INVOLVING NATIVE HEART WITH ANGINA PECTORIS, UNSPECIFIED VESSEL OR LESION TYPE (HCC): Primary | ICD-10-CM

## 2018-12-21 DIAGNOSIS — Z72.0 TOBACCO USE: ICD-10-CM

## 2018-12-21 DIAGNOSIS — E78.2 MIXED HYPERLIPIDEMIA: ICD-10-CM

## 2018-12-21 DIAGNOSIS — J44.9 CHRONIC OBSTRUCTIVE PULMONARY DISEASE, UNSPECIFIED COPD TYPE (HCC): ICD-10-CM

## 2018-12-21 DIAGNOSIS — I10 ESSENTIAL HYPERTENSION: ICD-10-CM

## 2018-12-21 LAB
MAX DIASTOLIC BP: 98 MMHG
MAX HEART RATE: 82 BPM
MAX PREDICTED HEART RATE: 150 BPM
MAX. SYSTOLIC BP: 198 MMHG
PROTOCOL NAME: NORMAL
REASON FOR TERMINATION: NORMAL
TARGET HR FORMULA: NORMAL
TIME IN EXERCISE PHASE: NORMAL

## 2018-12-21 PROCEDURE — 99214 OFFICE O/P EST MOD 30 MIN: CPT | Performed by: INTERNAL MEDICINE

## 2018-12-21 NOTE — PROGRESS NOTES
[facility]  Luis Daniel Perez  [facility] [address]  Cardiology Follow Up    Virgie Baires  1950  [id number]      1  Coronary artery disease involving native heart with angina pectoris, unspecified vessel or lesion type ([facility] )     2  Essential hypertension     3  Mixed hyperlipidemia     4  Tobacco use         Chief Complaint   Patient presents with    Follow-up       Interval History:  Patient presents for follow-up visit  Patient was seen in the emergency room with the symptoms of shortness of breath  Patient was seen by primary care physician and his inhalers were changed  Patient is feeling much better  Patient denies any history of exertional chest pain  Patient does have some wheezing  Patient continues to smoke in spite of repeated counseling  He states that he has been compliant with all his present medications  Patient Active Problem List   Diagnosis    Coronary artery disease with angina pectoris ([facility] )    Hypertension    Hyperlipidemia    Tobacco use     Past Medical History:   Diagnosis Date    [facility] )     Heart murmur     HTN (hypertension)     Hyperlipidemia     Myocardial infarction ([facility] )     SOB (shortness of breath)      Social History     Social History    Marital status:      Spouse name: N/A    Number of children: N/A    Years of education: N/A     Occupational History    Not on file       Social History Main Topics    Smoking status: Current Every Day Smoker    Smokeless tobacco: Never Used    Alcohol use No    Drug use: No    Sexual activity: Not on file     Other Topics Concern    Not on file     Social History Narrative    No narrative on file      Family History   Problem Relation Age of Onset    Family history unknown: Yes     Past Surgical History:   Procedure Laterality Date    CARDIAC CATHETERIZATION      CORONARY ANGIOPLASTY         Current Outpatient Prescriptions:     albuterol (PROVENTIL HFA,VENTOLIN HFA) 90 mcg/act inhaler, Inhale 2 puffs every 4 (four) hours, Disp: , Rfl:     amLODIPine (NORVASC) 10 mg tablet, Take 10 mg by mouth, Disp: , Rfl:     aspirin (ASPIR-LOW) 81 mg EC tablet, Take 1 tablet by mouth, Disp: , Rfl:     atorvastatin (LIPITOR) 20 mg tablet, Take 20 mg by mouth, Disp: , Rfl:     benazepril (LOTENSIN) 40 MG tablet, Take 20 mg by mouth daily  , Disp: , Rfl:     diltiazem (CARTIA XT) 300 mg 24 hr capsule, Take 300 mg by mouth daily  , Disp: , Rfl:     ipratropium-albuterol (COMBIVENT RESPIMAT) inhaler, Inhale, Disp: , Rfl:     tiotropium-olodaterol (STIOLTO RESPIMAT) 2 5-2 5 MCG/ACT inhaler, Inhale 2 puffs daily, Disp: , Rfl:     mometasone (ELOCON) 0 1 % cream, Apply topically, Disp: , Rfl:     predniSONE 10 mg tablet, Take 1 tablet (10 mg total) by mouth daily (Patient not taking: Reported on 12/21/2018 ), Disp: 15 tablet, Rfl: 0    rosuvastatin (CRESTOR) 20 MG tablet, Take 1 tablet by mouth daily, Disp: , Rfl:   Allergies   Allergen Reactions    Pollen Extract        Labs:  Hospital Outpatient Visit on 12/20/2018   Component Date Value    Protocol Name 12/20/2018 LEXISCAN-SIT     Time In Exercise Phase 12/20/2018 00:03:00     MAX   SYSTOLIC BP 83/14/3415 429     Max Diastolic Bp 08/26/1673 98     Max Heart Rate 12/20/2018 82     Max Predicted Heart Rate 12/20/2018 150     Reason for Termination 12/20/2018 Protocol Complete     Test Indication 12/20/2018                      Value:Chest Discomfort  CAD  ANGINA      Target Hr Formular 12/20/2018 (220 - Age)*85%    Admission on 12/11/2018, Discharged on 12/11/2018   Component Date Value    WBC 12/11/2018 8 39     RBC 12/11/2018 4 83     Hemoglobin 12/11/2018 15 7     Hematocrit 12/11/2018 47 6     MCV 12/11/2018 99*    MCH 12/11/2018 32 5     MCHC 12/11/2018 33 0     RDW 12/11/2018 13 3     MPV 12/11/2018 11 4     Platelets 98/70/5719 217     nRBC 12/11/2018 0     Neutrophils Relative 12/11/2018 67     Immat GRANS % 12/11/2018 0     Lymphocytes Relative 12/11/2018 19     Monocytes Relative 12/11/2018 8     Eosinophils Relative 12/11/2018 5     Basophils Relative 12/11/2018 1     Neutrophils Absolute 12/11/2018 5 66     Immature Grans Absolute 12/11/2018 0 02     Lymphocytes Absolute 12/11/2018 1 62     Monocytes Absolute 12/11/2018 0 64     Eosinophils Absolute 12/11/2018 0 40     Basophils Absolute 12/11/2018 0 05     Troponin I 12/11/2018 <0 02     Sodium 12/11/2018 142     Potassium 12/11/2018 3 8     Chloride 12/11/2018 107     CO2 12/11/2018 25     ANION GAP 12/11/2018 10     BUN 12/11/2018 15     Creatinine 12/11/2018 1 13     Glucose 12/11/2018 107     Calcium 12/11/2018 9 5     AST 12/11/2018 14     ALT 12/11/2018 23     Alkaline Phosphatase 12/11/2018 109     Total Protein 12/11/2018 7 4     Albumin 12/11/2018 3 7     Total Bilirubin 12/11/2018 0 80     eGFR 12/11/2018 65     NT-proBNP 12/11/2018 350*    Troponin I 12/11/2018 <0 02     Ventricular Rate 12/11/2018 72     Atrial Rate 12/11/2018 72     VT Interval 12/11/2018 210     QRSD Interval 12/11/2018 112     QT Interval 12/11/2018 412     QTC Interval 12/11/2018 451     QRS Axis 12/11/2018 54     T Wave Axis 12/11/2018 -5     Ventricular Rate 12/11/2018 79     Atrial Rate 12/11/2018 79     VT Interval 12/11/2018 206     QRSD Interval 12/11/2018 114     QT Interval 12/11/2018 386     QTC Interval 12/11/2018 442     QRS Axis 12/11/2018 65     T Wave Axis 12/11/2018 18      Imaging: X-ray Chest 2 Views    Result Date: 12/11/2018  Narrative: CHEST INDICATION:   chest pain  Smoker  COMPARISON:  12/18/2013 EXAM PERFORMED/VIEWS:  XR CHEST PA & LATERAL FINDINGS: Cardiomediastinal silhouette appears unremarkable  Emphysematous changes are noted consistent with chronic obstructive pulmonary disease  No airspace opacity to suggest focal pneumonia  No pneumothorax or pleural effusion   Osseous structures appear within normal limits for patient age  Impression: COPD  No acute cardiopulmonary disease  Workstation performed: [id number]     Ct Head Without Contrast    Result Date: 12/11/2018  Narrative: CT BRAIN - WITHOUT CONTRAST INDICATION:   Dizziness  COMPARISON:  None  TECHNIQUE:  CT examination of the brain was performed  In addition to axial images, coronal 2D reformatted images were created and submitted for interpretation  Radiation dose length product (DLP) for this visit:  959 mGy-cm   This examination, like all CT scans performed in the [facility], was performed utilizing techniques to minimize radiation dose exposure, including the use of iterative reconstruction and automated exposure control  IMAGE QUALITY:  Diagnostic  FINDINGS: PARENCHYMA:  No intracranial mass, mass effect or midline shift  No CT signs of acute infarction  No acute parenchymal hemorrhage  Periventricular, centrum semiovale, and subcortical white matter hypodensity is a nonspecific finding likely representing  chronic microangiopathy  Calcification bilateral cavernous internal carotid and distal vertebral arteries  VENTRICLES AND EXTRA-AXIAL SPACES:  No acute hydrocephalus  Mild prominence of CSF spaces diffusely attributed to generalized volume loss  VISUALIZED ORBITS AND PARANASAL SINUSES:  Moderate mucosal thickening bilateral maxillary, ethmoid, and frontal sinuses with adjacent cortical thickening  Orbits unremarkable  CALVARIUM AND EXTRACRANIAL SOFT TISSUES:  Normal      Impression: No CT evidence of acute intracranial process  Chronic microangiopathy  Moderate chronic paranasal sinusitis   Workstation performed: [id number]       Review of Systems:  Review of Systems   REVIEW OF SYSTEMS:  Constitutional:  Denies fever or chills   Eyes:  Denies change in visual acuity   HENT:  Denies nasal congestion or sore throat   Respiratory:  Shortness of breath and wheezing   Cardiovascular:  Denies chest pain or edema   GI:  Denies abdominal pain, nausea, vomiting, bloody stools or diarrhea   :  Denies dysuria, frequency, difficulty in micturition and nocturia  Musculoskeletal:  Denies back pain or joint pain   Neurologic:  Denies headache, focal weakness or sensory changes   Endocrine:  Denies polyuria or polydipsia   Lymphatic:  Denies swollen glands   Psychiatric:  Denies depression or anxiety     Physical Exam:    /82   Pulse 83   Ht 6' 3" (1 905 m)   Wt 108 kg (238 lb 9 6 oz)   SpO2 93%   BMI 29 82 kg/m²     Physical Exam   PHYSICAL EXAM:  General:  Patient is not in acute distress   Head: Normocephalic, Atraumatic  HEENT:  Both pupils normal-size atraumatic, normocephalic, nonicteric  Neck:  JVP not raised  Trachea central  No carotid bruit  Respiratory:  Decreased breath sounds with scattered rhonchi   Cardiovascular:  Regular rate and rhythm no S3 no murmurs  GI:  Abdomen soft nontender  No organomegaly  Lymphatic:  No cervical or inguinal lymphadenopathy  Neurologic:  Patient is awake alert, oriented   Grossly nonfocal    Discussion/Summary:  Patient with known history of hypertension COPD as well as smoking with symptoms of shortness of breath which appears pulmonary  Patient is feeling much better with the new inhalers  Patient had a pharmacological nuclear stress test which showed no evidence of ischemia with normal ejection fraction  Patient does have history of coronary artery disease in the past   Sensitivity and specificity as well as limitations of pharmacological stress testing discussed with the patient  Symptoms to watch out from cardiac standpoint which would indicate the need for further cardiac evaluation including cardiac catheterization discussed  Patient strongly counseled to quit smoking to prevent future cardiovascular events  Importance of salt restriction reinforced  Medications reviewed  Follow-up in a few months or earlier as needed  Follow-up with primary care physician

## 2018-12-21 NOTE — PROGRESS NOTES
Review of Systems:  Review of Systems    Physical Exam:    /82   Pulse 83   Ht 6' 3" (1 905 m)   Wt 108 kg (238 lb 9 6 oz)   SpO2 93%   BMI 29 82 kg/m²     Physical Exam    Discussion/Summary:  Please see the office visit on the same date

## 2019-05-30 ENCOUNTER — OFFICE VISIT (OUTPATIENT)
Dept: CARDIOLOGY CLINIC | Facility: CLINIC | Age: 69
End: 2019-05-30
Payer: MEDICARE

## 2019-05-30 VITALS
HEIGHT: 75 IN | BODY MASS INDEX: 29.09 KG/M2 | WEIGHT: 234 LBS | OXYGEN SATURATION: 93 % | HEART RATE: 72 BPM | DIASTOLIC BLOOD PRESSURE: 84 MMHG | SYSTOLIC BLOOD PRESSURE: 142 MMHG

## 2019-05-30 DIAGNOSIS — I25.119 CORONARY ARTERY DISEASE INVOLVING NATIVE HEART WITH ANGINA PECTORIS, UNSPECIFIED VESSEL OR LESION TYPE (HCC): Primary | ICD-10-CM

## 2019-05-30 DIAGNOSIS — I10 ESSENTIAL HYPERTENSION: ICD-10-CM

## 2019-05-30 DIAGNOSIS — Z72.0 TOBACCO USE: ICD-10-CM

## 2019-05-30 DIAGNOSIS — E78.2 MIXED HYPERLIPIDEMIA: ICD-10-CM

## 2019-05-30 PROCEDURE — 99213 OFFICE O/P EST LOW 20 MIN: CPT | Performed by: INTERNAL MEDICINE

## 2019-12-06 ENCOUNTER — OFFICE VISIT (OUTPATIENT)
Dept: CARDIOLOGY CLINIC | Facility: CLINIC | Age: 69
End: 2019-12-06
Payer: MEDICARE

## 2019-12-06 VITALS
WEIGHT: 235 LBS | HEART RATE: 82 BPM | DIASTOLIC BLOOD PRESSURE: 80 MMHG | OXYGEN SATURATION: 94 % | HEIGHT: 75 IN | BODY MASS INDEX: 29.22 KG/M2 | SYSTOLIC BLOOD PRESSURE: 130 MMHG

## 2019-12-06 DIAGNOSIS — I10 ESSENTIAL HYPERTENSION: ICD-10-CM

## 2019-12-06 DIAGNOSIS — E78.2 MIXED HYPERLIPIDEMIA: ICD-10-CM

## 2019-12-06 DIAGNOSIS — Z72.0 TOBACCO USE: ICD-10-CM

## 2019-12-06 DIAGNOSIS — I25.119 CORONARY ARTERY DISEASE INVOLVING NATIVE HEART WITH ANGINA PECTORIS, UNSPECIFIED VESSEL OR LESION TYPE (HCC): Primary | ICD-10-CM

## 2019-12-06 PROCEDURE — 99213 OFFICE O/P EST LOW 20 MIN: CPT | Performed by: INTERNAL MEDICINE

## 2019-12-06 NOTE — PROGRESS NOTES
[facility] [place]  [address] [facility]  [place] [address]  Cardiology Follow Up    Lindsey Orellana  1950  [id number]      1  Coronary artery disease involving native heart with angina pectoris, unspecified vessel or lesion type ([facility] )     2  Essential hypertension     3  Mixed hyperlipidemia     4  Tobacco use         Chief Complaint   Patient presents with    Follow-up    Coronary Artery Disease       Interval History:  Patient presents for follow-up visit  Patient denies any chest pain  Patient does have some shortness of breath and wheezing secondary to his COPD  Patient continues to smoke  Patient has no intentions to quit smoking  No history of leg edema orthopnea PND  No history of presyncope syncope  He states that he has been compliant with all his present medications  Patient Active Problem List   Diagnosis    Coronary artery disease with angina pectoris ([place] [address] )    Hypertension    Hyperlipidemia    Tobacco use     Past Medical History:   Diagnosis Date    Asbestosis ([place] [address] )     Heart murmur     HTN (hypertension)     Hyperlipidemia     Myocardial infarction ([place] [address] )     SOB (shortness of breath)      Social History     Socioeconomic History    Marital status:       Spouse name: Not on file    Number of children: Not on file    Years of education: Not on file    Highest education level: Not on file   Occupational History    Not on file   Social Needs    Financial resource strain: Not on file    Food insecurity:     Worry: Not on file     Inability: Not on file    Transportation needs:     Medical: Not on file     Non-medical: Not on file   Tobacco Use    Smoking status: Current Every Day Smoker    Smokeless tobacco: Never Used   Substance and Sexual Activity    Alcohol use: No    Drug use: No    Sexual activity: Not on file   Lifestyle    Physical activity:     Days per week: Not on file     Minutes per session: Not on file    Stress: Not on file Relationships    Social connections:     Talks on phone: Not on file     Gets together: Not on file     Attends [employer] service: Not on file     Active member of club or organization: Not on file     Attends meetings of clubs or organizations: Not on file     Relationship status: Not on file    Intimate partner violence:     Fear of current or ex partner: Not on file     Emotionally abused: Not on file     Physically abused: Not on file     Forced sexual activity: Not on file   Other Topics Concern    Not on file   Social History Narrative    Not on file      Family History   Family history unknown: Yes     Past Surgical History:   Procedure Laterality Date    CARDIAC CATHETERIZATION      CORONARY ANGIOPLASTY         Current Outpatient Medications:     albuterol (PROVENTIL HFA,VENTOLIN HFA) 90 mcg/act inhaler, Inhale 2 puffs every 4 (four) hours, Disp: , Rfl:     amLODIPine (NORVASC) 10 mg tablet, Take 10 mg by mouth daily , Disp: , Rfl:     aspirin (ASPIR-LOW) 81 mg EC tablet, Take 1 tablet by mouth daily , Disp: , Rfl:     atorvastatin (LIPITOR) 20 mg tablet, Take 20 mg by mouth daily , Disp: , Rfl:     benazepril (LOTENSIN) 40 MG tablet, Take 20 mg by mouth daily  , Disp: , Rfl:     diltiazem (CARTIA XT) 300 mg 24 hr capsule, Take 300 mg by mouth daily  , Disp: , Rfl:     ipratropium-albuterol (COMBIVENT RESPIMAT) inhaler, Inhale, Disp: , Rfl:     tiotropium-olodaterol (STIOLTO RESPIMAT) 2 5-2 5 MCG/ACT inhaler, Inhale 2 puffs daily, Disp: 1 Inhaler, Rfl: 0  Allergies   Allergen Reactions    Pollen Extract        Labs:  No visits with results within 2 Month(s) from this visit  Latest known visit with results is:   Hospital Outpatient Visit on 12/20/2018   Component Date Value    Protocol Name 12/20/2018 LEXISCAN-SIT     Time In Exercise Phase 12/20/2018 00:03:00     MAX   SYSTOLIC BP 49/84/7801 795     Max Diastolic Bp 87/91/4370 98     Max Heart Rate 12/20/2018 82     Max Predicted Heart Rate 12/20/2018 150     Reason for Termination 12/20/2018 Protocol Complete     Test Indication 12/20/2018                      Value:Chest Discomfort  CAD  ANGINA      Target Hr Formular 12/20/2018 (220 - Age)*85%      Imaging: No results found  Review of Systems:  Review of Systems   REVIEW OF SYSTEMS:  Constitutional:  Denies fever or chills   Eyes:  Denies change in visual acuity   HENT:  Denies nasal congestion or sore throat   Respiratory:   shortness of breath   Cardiovascular:  Denies chest pain or edema   GI:  Denies abdominal pain, nausea, vomiting, bloody stools or diarrhea   :  Denies dysuria, frequency, difficulty in micturition and nocturia  Musculoskeletal:  Denies back pain or joint pain   Neurologic:  Denies headache, focal weakness or sensory changes   Endocrine:  Denies polyuria or polydipsia   Lymphatic:  Denies swollen glands   Psychiatric:  Denies depression or anxiety   Physical Exam:    /80   Pulse 82   Ht 6' 3" (1 905 m)   Wt 107 kg (235 lb)   SpO2 94%   BMI 29 37 kg/m²     Physical Exam   PHYSICAL EXAM:  General:  Patient is not in acute distress   Head: Normocephalic, Atraumatic  HEENT:  Both pupils normal-size atraumatic, normocephalic, nonicteric  Neck:  JVP not raised  Trachea central  No carotid bruit  Respiratory:  Decreased breath sounds with scattered rhonchi  Cardiovascular:  Regular rate and rhythm no S3 no murmurs  GI:  Abdomen soft nontender  No organomegaly  Lymphatic:  No cervical or inguinal lymphadenopathy  Neurologic:  Patient is awake alert, oriented   Grossly nonfocal  Extremities no edema    Discussion/Summary:  Patient with multiple medical problems who seems to be doing reasonably well from cardiac standpoint  Previous studies reviewed with patient  Medications reviewed and possible side effects discussed  concepts of cardiovascular disease , signs and symptoms of heart disease  Dietary and risk factor modification reinforced   All questions answered  Safety measures reviewed  Patient advised to report any problems prompting medical attention  Patient counseled about hazards of smoking  Symptoms to watch out from cardiac standpoint which would indicate the need for further cardiac evaluation including cardiac catheterization discussed  Patient understands the sensitivity and specificity of stress test   Follow-up with primary care physician  Followup in 6 months or earlier as needed  Patient is agreeable with the plan of care

## 2020-07-24 ENCOUNTER — OFFICE VISIT (OUTPATIENT)
Dept: CARDIOLOGY CLINIC | Facility: CLINIC | Age: 70
End: 2020-07-24
Payer: MEDICARE

## 2020-07-24 VITALS
SYSTOLIC BLOOD PRESSURE: 148 MMHG | HEIGHT: 75 IN | BODY MASS INDEX: 28.97 KG/M2 | DIASTOLIC BLOOD PRESSURE: 88 MMHG | OXYGEN SATURATION: 98 % | HEART RATE: 90 BPM | WEIGHT: 233 LBS

## 2020-07-24 DIAGNOSIS — E78.2 MIXED HYPERLIPIDEMIA: ICD-10-CM

## 2020-07-24 DIAGNOSIS — I10 ESSENTIAL HYPERTENSION: ICD-10-CM

## 2020-07-24 DIAGNOSIS — Z72.0 TOBACCO USE: ICD-10-CM

## 2020-07-24 DIAGNOSIS — I25.119 CORONARY ARTERY DISEASE INVOLVING NATIVE HEART WITH ANGINA PECTORIS, UNSPECIFIED VESSEL OR LESION TYPE (HCC): Primary | ICD-10-CM

## 2020-07-24 PROCEDURE — 99213 OFFICE O/P EST LOW 20 MIN: CPT | Performed by: INTERNAL MEDICINE

## 2020-07-24 NOTE — PROGRESS NOTES
[facility]  [address] [facility] [place] [address]  Cardiology Follow Up    Antonella Kaur  1950  [id number]      1  Coronary artery disease involving native heart with angina pectoris, unspecified vessel or lesion type ([place] [address] )     2  Essential hypertension     3  Tobacco use     4  Mixed hyperlipidemia         Chief Complaint   Patient presents with    Follow-up    Coronary Artery Disease       Interval History:  Patient presents for follow-up visit  Patient does have COPD  Patient denies any chest pain  No shortness of breath out of the ordinary  Patient continues to smoke in spite of repeated counseling  No history of leg edema orthopnea PND  His primary care physician is retiring at the end of this year  He is supposed to get blood work  No history of orthopnea PND  Patient Active Problem List   Diagnosis    Coronary artery disease with angina pectoris ([place] [address] )    Hypertension    Hyperlipidemia    Tobacco use     Past Medical History:   Diagnosis Date    Asbestosis ([place] [address] )     Heart murmur     HTN (hypertension)     Hyperlipidemia     Myocardial infarction ([place] [address] )     SOB (shortness of breath)      Social History     Socioeconomic History    Marital status:       Spouse name: Not on file    Number of children: Not on file    Years of education: Not on file    Highest education level: Not on file   Occupational History    Not on file   Social Needs    Financial resource strain: Not on file    Food insecurity:     Worry: Not on file     Inability: Not on file    Transportation needs:     Medical: Not on file     Non-medical: Not on file   Tobacco Use    Smoking status: Current Every Day Smoker    Smokeless tobacco: Never Used   Substance and Sexual Activity    Alcohol use: No    Drug use: No    Sexual activity: Not on file   Lifestyle    Physical activity:     Days per week: Not on file     Minutes per session: Not on file    Stress: Not on file Relationships    Social connections:     Talks on phone: Not on file     Gets together: Not on file     Attends [employer] service: Not on file     Active member of club or organization: Not on file     Attends meetings of clubs or organizations: Not on file     Relationship status: Not on file    Intimate partner violence:     Fear of current or ex partner: Not on file     Emotionally abused: Not on file     Physically abused: Not on file     Forced sexual activity: Not on file   Other Topics Concern    Not on file   Social History Narrative    Not on file      Family History   Family history unknown: Yes     Past Surgical History:   Procedure Laterality Date    CARDIAC CATHETERIZATION      CORONARY ANGIOPLASTY         Current Outpatient Medications:     amLODIPine (NORVASC) 10 mg tablet, Take 10 mg by mouth daily , Disp: , Rfl:     aspirin (ASPIR-LOW) 81 mg EC tablet, Take 1 tablet by mouth daily , Disp: , Rfl:     atorvastatin (LIPITOR) 20 mg tablet, Take 20 mg by mouth daily , Disp: , Rfl:     benazepril (LOTENSIN) 40 MG tablet, Take 20 mg by mouth daily  , Disp: , Rfl:     diltiazem (CARTIA XT) 300 mg 24 hr capsule, Take 300 mg by mouth daily  , Disp: , Rfl:     ipratropium-albuterol (COMBIVENT RESPIMAT) inhaler, Inhale, Disp: , Rfl:     tiotropium-olodaterol (STIOLTO RESPIMAT) 2 5-2 5 MCG/ACT inhaler, Inhale 2 puffs daily, Disp: 1 Inhaler, Rfl: 0  Allergies   Allergen Reactions    Pollen Extract        Labs:  No visits with results within 2 Month(s) from this visit  Latest known visit with results is:   Hospital Outpatient Visit on 12/20/2018   Component Date Value    Protocol Name 12/20/2018 LEXISCAN-SIT     Time In Exercise Phase 12/20/2018 00:03:00     MAX   SYSTOLIC BP 69/09/0512 560     Max Diastolic Bp 61/15/4779 98     Max Heart Rate 12/20/2018 82     Max Predicted Heart Rate 12/20/2018 150     Reason for Termination 12/20/2018 Protocol Complete     Test Indication 12/20/2018 Value:Chest Discomfort  CAD  ANGINA      Target Hr Formular 12/20/2018 (220 - Age)*85%      Imaging: No results found  Review of Systems:  Review of Systems   REVIEW OF SYSTEMS:  Constitutional:  Denies fever or chills   Eyes:  Denies change in visual acuity   HENT:  Denies nasal congestion or sore throat   Respiratory:   shortness of breath   Cardiovascular:  Denies chest pain or edema   GI:  Denies abdominal pain, nausea, vomiting, bloody stools or diarrhea   :  Denies dysuria, frequency, difficulty in micturition and nocturia  Musculoskeletal:  Denies back pain or joint pain   Neurologic:  Denies headache, focal weakness or sensory changes   Endocrine:  Denies polyuria or polydipsia   Lymphatic:  Denies swollen glands   Psychiatric:  Denies depression or anxiety     Physical Exam:    /88   Pulse 90   Ht 6' 3" (1 905 m)   Wt 106 kg (233 lb)   SpO2 98%   BMI 29 12 kg/m²     Physical Exam   PHYSICAL EXAM:  General:  Patient is not in acute distress   Head: Normocephalic, Atraumatic  HEENT:  Both pupils normal-size atraumatic, normocephalic, nonicteric  Neck:  JVP not raised  Trachea central  No carotid bruit  Respiratory:  Decreased breath sounds with scattered rhonchi  Cardiovascular:  Regular rate and rhythm no S3 no murmurs  GI:  Abdomen soft nontender  No organomegaly  Lymphatic:  No cervical or inguinal lymphadenopathy  Neurologic:  Patient is awake alert, oriented   Grossly nonfocal  Extremities no edema    Discussion/Summary:  Patient with multiple medical problems who seems to be doing reasonably well from cardiac standpoint  Previous studies reviewed with patient  Medications reviewed and possible side effects discussed  concepts of cardiovascular disease , signs and symptoms of heart disease  Dietary and risk factor modification reinforced  All questions answered  Safety measures reviewed  Patient advised to report any problems prompting medical attention    Patient counseled about hazards of smoking  Patient encouraged to get his blood work done which has been ordered by primary care physician  Symptoms to watch out from cardiac standpoint which would indicate the need for further cardiac evaluation discussed  Follow-up in 6 months  Follow-up with primary care physician

## 2020-12-28 ENCOUNTER — HOSPITAL ENCOUNTER (EMERGENCY)
Facility: HOSPITAL | Age: 70
Discharge: HOME | End: 2020-12-28
Attending: EMERGENCY MEDICINE
Payer: MEDICARE

## 2020-12-28 VITALS
OXYGEN SATURATION: 94 % | HEART RATE: 98 BPM | TEMPERATURE: 98.1 F | RESPIRATION RATE: 20 BRPM | DIASTOLIC BLOOD PRESSURE: 95 MMHG | SYSTOLIC BLOOD PRESSURE: 169 MMHG

## 2020-12-28 DIAGNOSIS — R04.0 ACUTE ANTERIOR EPISTAXIS: Primary | ICD-10-CM

## 2020-12-28 DIAGNOSIS — R04.0 LEFT-SIDED EPISTAXIS: ICD-10-CM

## 2020-12-28 PROCEDURE — 99283 EMERGENCY DEPT VISIT LOW MDM: CPT

## 2020-12-28 PROCEDURE — 30901 CONTROL OF NOSEBLEED: CPT | Performed by: EMERGENCY MEDICINE

## 2020-12-28 PROCEDURE — 99284 EMERGENCY DEPT VISIT MOD MDM: CPT | Performed by: EMERGENCY MEDICINE

## 2020-12-28 RX ORDER — OXYMETAZOLINE HYDROCHLORIDE 0.05 G/100ML
2 SPRAY NASAL ONCE
Status: COMPLETED | OUTPATIENT
Start: 2020-12-28 | End: 2020-12-28

## 2020-12-28 RX ADMIN — OXYMETAZOLINE HYDROCHLORIDE 2 SPRAY: 0.05 SPRAY NASAL at 19:23

## 2021-01-15 ENCOUNTER — OFFICE VISIT (OUTPATIENT)
Dept: CARDIOLOGY CLINIC | Facility: CLINIC | Age: 71
End: 2021-01-15
Payer: MEDICARE

## 2021-01-15 VITALS
HEART RATE: 59 BPM | WEIGHT: 238 LBS | DIASTOLIC BLOOD PRESSURE: 82 MMHG | BODY MASS INDEX: 29.59 KG/M2 | OXYGEN SATURATION: 95 % | SYSTOLIC BLOOD PRESSURE: 148 MMHG | HEIGHT: 75 IN

## 2021-01-15 DIAGNOSIS — Z72.0 TOBACCO USE: ICD-10-CM

## 2021-01-15 DIAGNOSIS — I10 ESSENTIAL HYPERTENSION: ICD-10-CM

## 2021-01-15 DIAGNOSIS — I25.119 CORONARY ARTERY DISEASE INVOLVING NATIVE HEART WITH ANGINA PECTORIS, UNSPECIFIED VESSEL OR LESION TYPE (HCC): Primary | ICD-10-CM

## 2021-01-15 DIAGNOSIS — E78.2 MIXED HYPERLIPIDEMIA: ICD-10-CM

## 2021-01-15 PROCEDURE — 99214 OFFICE O/P EST MOD 30 MIN: CPT | Performed by: INTERNAL MEDICINE

## 2021-01-15 NOTE — PROGRESS NOTES
[facility]  [id number] [place] [id number]  Cardiology Follow Up    Aden Monk  1950  [id number]      1  Coronary artery disease involving native heart with angina pectoris, unspecified vessel or lesion type ([facility] )     2  Essential hypertension     3  Tobacco use     4  Mixed hyperlipidemia         Chief Complaint   Patient presents with    Follow-up       Interval History:   Patient presents for follow-up visit  Patient denies any chest pain  No shortness of breath out of the ordinary  No history of leg edema orthopnea PND  No history of presyncope syncope  He states that he has been compliant with all his present medications  He continues to smoke in spite of repeated counseling  He had an episode of epistaxis and was treated in the emergency room  He is scheduled to see his new primary care physician in April  Patient Active Problem List   Diagnosis    Coronary artery disease with angina pectoris ([facility] )    Hypertension    Hyperlipidemia    Tobacco use     Past Medical History:   Diagnosis Date    Asbestosis ([place] [address] )     Heart murmur     HTN (hypertension)     Hyperlipidemia     Myocardial infarction ([place] [address] )     SOB (shortness of breath)      Social History     Socioeconomic History    Marital status:       Spouse name: Not on file    Number of children: Not on file    Years of education: Not on file    Highest education level: Not on file   Occupational History    Not on file   Social Needs    Financial resource strain: Not on file    Food insecurity     Worry: Not on file     Inability: Not on file    Transportation needs     Medical: Not on file     Non-medical: Not on file   Tobacco Use    Smoking status: Current Every Day Smoker     Packs/day: 1 00    Smokeless tobacco: Never Used   Substance and Sexual Activity    Alcohol use: No    Drug use: No    Sexual activity: Not on file   Lifestyle    Physical activity     Days per week: Not on file     Minutes per session: Not on file    Stress: Not on file   Relationships    Social connections     Talks on phone: Not on file     Gets together: Not on file     Attends Adventism service: Not on file     Active member of club or organization: Not on file     Attends meetings of clubs or organizations: Not on file     Relationship status: Not on file    Intimate partner violence     Fear of current or ex partner: Not on file     Emotionally abused: Not on file     Physically abused: Not on file     Forced sexual activity: Not on file   Other Topics Concern    Not on file   Social History Narrative    Not on file      Family History   Family history unknown: Yes     Past Surgical History:   Procedure Laterality Date    CARDIAC CATHETERIZATION      CORONARY ANGIOPLASTY         Current Outpatient Medications:     amLODIPine (NORVASC) 10 mg tablet, Take 10 mg by mouth daily , Disp: , Rfl:     aspirin (ASPIR-LOW) 81 mg EC tablet, Take 1 tablet by mouth daily , Disp: , Rfl:     atorvastatin (LIPITOR) 20 mg tablet, Take 20 mg by mouth daily , Disp: , Rfl:     benazepril (LOTENSIN) 40 MG tablet, Take 20 mg by mouth daily  , Disp: , Rfl:     diltiazem (CARTIA XT) 300 mg 24 hr capsule, Take 300 mg by mouth daily  , Disp: , Rfl:     ipratropium-albuterol (COMBIVENT RESPIMAT) inhaler, Inhale, Disp: , Rfl:     tiotropium-olodaterol (STIOLTO RESPIMAT) 2 5-2 5 MCG/ACT inhaler, Inhale 2 puffs daily, Disp: 1 Inhaler, Rfl: 0  Allergies   Allergen Reactions    Pollen Extract        Labs:  No visits with results within 2 Month(s) from this visit  Latest known visit with results is:   Hospital Outpatient Visit on 12/20/2018   Component Date Value    Protocol Name 12/20/2018 LEXISCAN-SIT     Time In Exercise Phase 12/20/2018 00:03:00     MAX   SYSTOLIC BP 20/92/7549 665     Max Diastolic Bp 44/64/2561 98     Max Heart Rate 12/20/2018 82     Max Predicted Heart Rate 12/20/2018 150     Reason for Termination 12/20/2018 Protocol Complete     Test Indication 12/20/2018                      Value:Chest Discomfort  CAD  ANGINA      Target Hr Formular 12/20/2018 (220 - Age)*85%      Imaging: No results found  Review of Systems:  Review of Systems     REVIEW OF SYSTEMS:  Constitutional:  Denies fever or chills   Eyes:  Denies change in visual acuity   HENT:  Denies nasal congestion or sore throat   Respiratory:   shortness of breath   Cardiovascular:  Denies chest pain or edema   GI:  Denies abdominal pain, nausea, vomiting, bloody stools or diarrhea   :  Denies dysuria, frequency, difficulty in micturition and nocturia  Musculoskeletal:  Denies back pain or joint pain   Neurologic:  Denies headache, focal weakness or sensory changes   Endocrine:  Denies polyuria or polydipsia   Lymphatic:  Denies swollen glands   Psychiatric:  Denies depression or anxiety     Physical Exam:    /82   Pulse 59   Ht 6' 3" (1 905 m)   Wt 108 kg (238 lb)   SpO2 95%   BMI 29 75 kg/m²     Physical Exam   PHYSICAL EXAM:  General:  Patient is not in acute distress   Head: Normocephalic, Atraumatic  HEENT:  Both pupils normal-size atraumatic, normocephalic, nonicteric  Neck:  JVP not raised  Trachea central  No carotid bruit  Respiratory:   Decreased breath sounds bilateral  Cardiovascular:  Regular rate and rhythm no S3 no murmurs  GI:  Abdomen soft nontender  No organomegaly  Lymphatic:  No cervical or inguinal lymphadenopathy  Neurologic:  Patient is awake alert, oriented   Grossly nonfocal    Extremities no edema    Discussion/Summary:   Patient with multiple medical problems who seems to be doing reasonably well from cardiac standpoint  Previous studies reviewed with patient  Medications reviewed and possible side effects discussed  concepts of cardiovascular disease , signs and symptoms of heart disease  Dietary and risk factor modification reinforced  All questions answered  Safety measures reviewed   Patient advised to report any problems prompting medical attention  importance of salt restriction reinforced  Symptoms to watch out from cardiac standpoint which would indicate the need for further cardiac evaluation also discussed  Medications reviewed  Patient counseled about hazards of smoking  Patient is going to establish with new primary care physician in April  Patient did have blood work towards the end of last year with which was reviewed  Patient had a few questions which were answered  Follow-up in 6 months or earlier as needed

## 2021-01-18 ENCOUNTER — TELEPHONE (OUTPATIENT)
Dept: CARDIOLOGY CLINIC | Facility: CLINIC | Age: 71
End: 2021-01-18

## 2021-04-02 ENCOUNTER — DOCUMENTATION (OUTPATIENT)
Dept: INTERNAL MEDICINE CLINIC | Facility: CLINIC | Age: 71
End: 2021-04-02

## 2021-04-02 ENCOUNTER — TELEPHONE (OUTPATIENT)
Dept: INTERNAL MEDICINE CLINIC | Facility: CLINIC | Age: 71
End: 2021-04-02

## 2021-04-02 ENCOUNTER — TELEPHONE (OUTPATIENT)
Dept: CARDIOLOGY CLINIC | Facility: CLINIC | Age: 71
End: 2021-04-02

## 2021-04-02 ENCOUNTER — OFFICE VISIT (OUTPATIENT)
Dept: INTERNAL MEDICINE CLINIC | Facility: CLINIC | Age: 71
End: 2021-04-02
Payer: MEDICARE

## 2021-04-02 VITALS
OXYGEN SATURATION: 96 % | RESPIRATION RATE: 18 BRPM | BODY MASS INDEX: 28.85 KG/M2 | DIASTOLIC BLOOD PRESSURE: 86 MMHG | TEMPERATURE: 98 F | HEIGHT: 75 IN | SYSTOLIC BLOOD PRESSURE: 140 MMHG | WEIGHT: 232 LBS | HEART RATE: 55 BPM

## 2021-04-02 DIAGNOSIS — I10 ESSENTIAL HYPERTENSION: Primary | ICD-10-CM

## 2021-04-02 DIAGNOSIS — I25.119 CORONARY ARTERY DISEASE INVOLVING NATIVE HEART WITH ANGINA PECTORIS, UNSPECIFIED VESSEL OR LESION TYPE (HCC): ICD-10-CM

## 2021-04-02 DIAGNOSIS — J43.9 PULMONARY EMPHYSEMA, UNSPECIFIED EMPHYSEMA TYPE (HCC): ICD-10-CM

## 2021-04-02 DIAGNOSIS — R09.89 BRUIT OF RIGHT CAROTID ARTERY: ICD-10-CM

## 2021-04-02 DIAGNOSIS — Z72.0 TOBACCO USE: ICD-10-CM

## 2021-04-02 DIAGNOSIS — R20.2 PARESTHESIA OF RIGHT ARM: ICD-10-CM

## 2021-04-02 DIAGNOSIS — I48.91 NEW ONSET ATRIAL FIBRILLATION (HCC): ICD-10-CM

## 2021-04-02 DIAGNOSIS — I48.91 NEW ONSET A-FIB (HCC): Primary | ICD-10-CM

## 2021-04-02 DIAGNOSIS — I11.0 BENIGN HYPERTENSIVE HEART DISEASE WITH CONGESTIVE HEART FAILURE (HCC): ICD-10-CM

## 2021-04-02 DIAGNOSIS — E78.2 MIXED HYPERLIPIDEMIA: ICD-10-CM

## 2021-04-02 PROCEDURE — 99205 OFFICE O/P NEW HI 60 MIN: CPT | Performed by: PHYSICIAN ASSISTANT

## 2021-04-02 PROCEDURE — 93000 ELECTROCARDIOGRAM COMPLETE: CPT | Performed by: PHYSICIAN ASSISTANT

## 2021-04-02 NOTE — TELEPHONE ENCOUNTER
Spoke with the patient and gave him the inform regarding get an Echo and Holter Monitor done before being seen by the Dr Isabel Quintanilla on Monday April 19th at 8:40am   Patient understood  Thank you

## 2021-04-02 NOTE — PROGRESS NOTES
Assessment/Plan:      Patient Instructions    At this time will continue patient's current medications  Would like him to have carotid ultrasound done and labs and return in 2 weeks to review  Due to new finding of atrial fibrillation will initiate Eliquis 5 mg twice a day  I have discussed case with his cardiologist who will be ordering an echo and Holter monitoring, and seeing the patient later next week  Patient will stop his baby aspirin  Quality Measures: BMI Counseling: Body mass index is 29 kg/m²  The BMI is above normal  Nutrition recommendations include decreasing portion sizes, encouraging healthy choices of fruits and vegetables, consuming healthier snacks and moderation in carbohydrate intake  Exercise recommendations include exercising 3-5 times per week  Tobacco Cessation Counseling: Tobacco cessation counseling was provided  The patient is sincerely urged to quit consumption of tobacco  He is not ready to quit tobacco         Return in about 2 weeks (around 4/16/2021) for Next scheduled follow up  Diagnoses and all orders for this visit:    Essential hypertension  -     POCT ECG    Mixed hyperlipidemia    Tobacco use    Coronary artery disease involving native heart with angina pectoris, unspecified vessel or lesion type ([facility] )  -     POCT ECG    Pulmonary emphysema, unspecified emphysema type (HCC)    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 (HCC)    Bruit of right carotid artery  -     VAS carotid complete study; Future    New onset atrial fibrillation (HCC)  -     apixaban (ELIQUIS) 5 mg; Take 1 tablet (5 mg total) by mouth 2 (two) times a day    Paresthesia of right arm    Other orders  -     Cancel: PSA, Total Screen; Future          Subjective:      Patient ID: Michael Preciado is a 65 y o  male  68-year-old male presents to establish with this practice as his previous provider has retired  I have known this patient from the previous practice    At this point he stating he is feeling pretty good other than his chronic COPD symptoms which she states are both from smoking but also 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, of shortness of breath and wheezing  He is treated for hypertension, hyperlipidemia, history of coronary artery disease (follows with cardiology)  Has been undergoing a workup for pain in tingling of his right arm  He is stating that so far this has been attributed to 3 masses that are in his right upper arm  He can push on 1 of the masses and cause the pain to occur, push on another 1 and the pain will subside  Patient is refusing low-dose CT lung screening  Also refusing repeat colonoscopy  Today's physical exam has picked up new onset atrial fibrillation  Patient's cardiologist was contacted  He will be started on Eliquis, cardiology will contact him for echocardiogram and Holter monitoring  Patient is denying chest pain, palpitations or worsening shortness of breath, no edema  No neurologic symptoms  Tobacco abuse:  Discussion held on cessation  Patient informs me he is not interested in stopping  EMR has been reviewed, clarified, and updated with patient today        ALLERGIES:  Allergies   Allergen Reactions    Pollen Extract        CURRENT MEDICATIONS:    Current Outpatient Medications:     amLODIPine (NORVASC) 10 mg tablet, Take 10 mg by mouth daily , Disp: , Rfl:     atorvastatin (LIPITOR) 20 mg tablet, Take 20 mg by mouth daily , Disp: , Rfl:     benazepril (LOTENSIN) 40 MG tablet, Take 20 mg by mouth daily  , Disp: , Rfl:     diltiazem (CARTIA XT) 300 mg 24 hr capsule, Take 300 mg by mouth daily  , Disp: , Rfl:     ipratropium-albuterol (COMBIVENT RESPIMAT) inhaler, Inhale, Disp: , Rfl:     tiotropium-olodaterol (STIOLTO RESPIMAT) 2 5-2 5 MCG/ACT inhaler, Inhale 2 puffs daily, Disp: 1 Inhaler, Rfl: 0    apixaban (ELIQUIS) 5 mg, Take 1 tablet (5 mg total) by mouth 2 (two) times a day, Disp: 60 tablet, Rfl: 3    ACTIVE PROBLEM LIST:  Patient Active Problem List   Diagnosis    Coronary artery disease with angina pectoris ([place] [address] )    Hypertension    Hyperlipidemia    Tobacco use    Pulmonary emphysema ([place] [address] )    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 ([place] [address] )    New onset atrial fibrillation ([place] [address] )    Bruit of right carotid artery    Paresthesia of right arm       PAST MEDICAL HISTORY:  Past Medical History:   Diagnosis Date    Asbestosis ([place] [address] )     Heart murmur     HTN (hypertension)     Hyperlipidemia     Myocardial infarction (HCC)     SOB (shortness of breath)        PAST SURGICAL HISTORY:  Past Surgical History:   Procedure Laterality Date    CARDIAC CATHETERIZATION      CORONARY ANGIOPLASTY         FAMILY HISTORY:  Family History   Problem Relation Age of Onset    Cancer Mother         unkn type    Dementia Sister     Heart murmur Son        SOCIAL HISTORY:  Social History     Socioeconomic History    Marital status:       Spouse name: Not on file    Number of children: Not on file    Years of education: Not on file    Highest education level: Not on file   Occupational History    Not on file   Social Needs    Financial resource strain: Not on file    Food insecurity     Worry: Not on file     Inability: Not on file    Transportation needs     Medical: Not on file     Non-medical: Not on file   Tobacco Use    Smoking status: Current Every Day Smoker     Packs/day: 1 00     Years: 61 00     Pack years: 61 00    Smokeless tobacco: Never Used   Substance and Sexual Activity    Alcohol use: No     Comment: stopped 6 yrs ago    Drug use: No    Sexual activity: Not Currently   Lifestyle    Physical activity     Days per week: Not on file     Minutes per session: Not on file    Stress: Not on file   Relationships    Social connections     Talks on phone: Not on file     Gets together: Not on file     Attends Alevism service: Not on file     Active member of club or organization: Not on file     Attends meetings of clubs or organizations: Not on file     Relationship status: Not on file    Intimate partner violence     Fear of current or ex partner: Not on file     Emotionally abused: Not on file     Physically abused: Not on file     Forced sexual activity: Not on file   Other Topics Concern    Not on file   Social History Narrative        Retired-    Hobbies-playing stock market    3 children    dentures       Review of Systems   Constitutional: Negative for activity change, chills, fatigue and fever  HENT: Negative for congestion  Eyes: Negative for discharge and visual disturbance  Respiratory: Positive for shortness of breath and wheezing  Negative for cough and chest tightness  Cardiovascular: Negative for chest pain, palpitations and leg swelling  Gastrointestinal: Negative for abdominal pain, blood in stool, constipation, diarrhea, nausea and vomiting  Endocrine: Negative for polydipsia, polyphagia and polyuria  Genitourinary: Negative for difficulty urinating  Musculoskeletal: Negative for arthralgias and myalgias  Skin: Negative for rash  Allergic/Immunologic: Negative for immunocompromised state  Neurological: Positive for numbness  Negative for dizziness, syncope, weakness, light-headedness and headaches  Hematological: Negative for adenopathy  Does not bruise/bleed easily  Psychiatric/Behavioral: Negative for dysphoric mood, sleep disturbance and suicidal ideas  The patient is not nervous/anxious  Objective:  Vitals:    04/02/21 0806 04/02/21 0846   BP: 162/92 140/86   BP Location: Left arm Left arm   Patient Position: Sitting Sitting   Cuff Size: Standard    Pulse: 55    Resp: 18    Temp: 98 °F (36 7 °C)    TempSrc: Tympanic    SpO2: 96%    Weight: 105 kg (232 lb)    Height: 6' 3" (1 905 m)      Body mass index is 29 kg/m²  Physical Exam  Vitals signs and nursing note reviewed     Constitutional: General: He is not in acute distress  Appearance: Normal appearance  He is well-developed  He is not ill-appearing  HENT:      Head: Normocephalic  Right Ear: Tympanic membrane, ear canal and external ear normal       Left Ear: Tympanic membrane, ear canal and external ear normal       Nose: Nose normal       Mouth/Throat:      Mouth: Mucous membranes are moist       Pharynx: Oropharynx is clear  No oropharyngeal exudate  Comments:   Edentulous/corrected  Eyes:      General: No scleral icterus  Right eye: No discharge  Left eye: No discharge  Extraocular Movements: Extraocular movements intact  Conjunctiva/sclera: Conjunctivae normal       Pupils: Pupils are equal, round, and reactive to light  Neck:      Musculoskeletal: Normal range of motion and neck supple  Thyroid: No thyromegaly  Vascular: Carotid bruit ( right carotid bruit) present  No JVD  Trachea: No tracheal deviation  Cardiovascular:      Rate and Rhythm: Normal rate  Rhythm regularly irregular  Pulses:           Femoral pulses are 1+ on the right side  Popliteal pulses are 1+ on the right side  Dorsalis pedis pulses are 1+ on the right side  Posterior tibial pulses are 1+ on the right side  Heart sounds: Murmur present  Systolic murmur present with a grade of 2/6  No friction rub  No gallop  Pulmonary:      Effort: Pulmonary effort is normal  No respiratory distress  Breath sounds: Wheezing present  No rales  Comments:  Decreased breath sounds throughout  Prolonged expiratory phase  Scattered late inspiratory and expiratory wheezing  Scattered rhonchi throughout  Chest:      Chest wall: No tenderness  Abdominal:      General: Bowel sounds are normal  There is no distension  Palpations: Abdomen is soft  There is no hepatomegaly, splenomegaly or mass  Tenderness: There is no abdominal tenderness   There is no right CVA tenderness, left CVA tenderness, guarding or rebound  Genitourinary:     Comments:   Circumcised adult male  No lesions of the phallus, scrotum, or testes  No inguinal hernias  Rectal exam:  Normal rectal tone  Prostate normal size shape and consistency without dominant nodule  Musculoskeletal: Normal range of motion  General: No tenderness or deformity  Right lower leg: No edema  Left lower leg: No edema  Lymphadenopathy:      Cervical: No cervical adenopathy  Skin:     General: Skin is warm and dry  Findings: No rash  Neurological:      General: No focal deficit present  Mental Status: He is alert and oriented to person, place, and time  Cranial Nerves: No cranial nerve deficit  Sensory: No sensory deficit  Motor: No weakness or abnormal muscle tone  Coordination: Coordination normal       Gait: Gait normal       Deep Tendon Reflexes: Reflexes are normal and symmetric  Reflexes normal    Psychiatric:         Mood and Affect: Mood normal          Behavior: Behavior normal          Thought Content: Thought content normal          Judgment: Judgment normal            RESULTS:    No results found for this or any previous visit (from the past 1008 hour(s))  This note was created with voice recognition software  Phonic, grammatical and spelling errors may be present within the note as a result

## 2021-04-02 NOTE — TELEPHONE ENCOUNTER
----- Message from Jenny Brandt MD sent at 4/2/2021  9:03 AM EDT -----  Regarding: Echo Holter next week followed by appointment on April 19th  This patient was seen by Miri Knight today  He has new onset AFib  Patient will need echocardiogram and Holter monitor next week  Order is in the system  I will see him on 19th April after the echocardiogram and Holter monitor  Thank you

## 2021-04-02 NOTE — PATIENT INSTRUCTIONS
At this time will continue patient's current medications  Would like him to have carotid ultrasound done and labs and return in 2 weeks to review  Due to new finding of atrial fibrillation will initiate Eliquis 5 mg twice a day  I have discussed case with his cardiologist who will be ordering an echo and Holter monitoring, and seeing the patient later next week  Patient will stop his baby aspirin

## 2021-04-02 NOTE — TELEPHONE ENCOUNTER
----- Message from Geeta Stratton MD sent at 4/2/2021  9:03 AM EDT -----  Regarding: Echo Holter next week followed by appointment on April 19th  This patient was seen by Christina Phillips today  He has new onset AFib  Patient will need echocardiogram and Holter monitor next week  Order is in the system  I will see him on 19th April after the echocardiogram and Holter monitor  Thank you

## 2021-04-02 NOTE — TELEPHONE ENCOUNTER
Spoke with the patient and explained to him that someone will be calling him regarding scheduling a Echo and Holter Monitor and he was schedule with Dr Isabel Quintanilla on Monday April 19th at 8:40am to review the testing  Patient understood  Thank you

## 2021-04-05 ENCOUNTER — TELEPHONE (OUTPATIENT)
Dept: CARDIOLOGY CLINIC | Facility: CLINIC | Age: 71
End: 2021-04-05

## 2021-04-05 NOTE — TELEPHONE ENCOUNTER
Pt would like phone call to go over why his pcp ordered specific testing  He wants to know what they are for

## 2021-04-05 NOTE — TELEPHONE ENCOUNTER
Patient wants to know why his PCP, Miguel Atwood, ordered a VAS Carotid study  He wants to know why his PCP ordered it and not you  If you are his cardiologist, he feels you should order it and if you never ordered this test, he wants to know why his PCP would  I advised the patient to contact his PCP and he said he wants to know from you  PCP put the patient on Eliquis because he hears a noise on the right side of his neck  Once the patient  the medicine, he read the side effects and he is nervous because of the side effects  Patient is taking the eliquis but only until his testing  Patient states he spoke with a cousin who takes Eliquis and he told him that he has to have blood work every three months and it makes him go to the bathroom constantly  Patient states its the first he saw Miguel Atwood and he feels like he is just doing all these tests and he trusts you more

## 2021-04-05 NOTE — TELEPHONE ENCOUNTER
Please add him on to be seen tomorrow in the office  We will go over all his questions and concerns

## 2021-04-06 ENCOUNTER — OFFICE VISIT (OUTPATIENT)
Dept: CARDIOLOGY CLINIC | Facility: CLINIC | Age: 71
End: 2021-04-06
Payer: MEDICARE

## 2021-04-06 VITALS
BODY MASS INDEX: 29.59 KG/M2 | SYSTOLIC BLOOD PRESSURE: 140 MMHG | HEIGHT: 75 IN | WEIGHT: 238 LBS | HEART RATE: 67 BPM | DIASTOLIC BLOOD PRESSURE: 80 MMHG | OXYGEN SATURATION: 92 %

## 2021-04-06 DIAGNOSIS — I48.91 NEW ONSET A-FIB (HCC): Primary | ICD-10-CM

## 2021-04-06 DIAGNOSIS — E78.2 MIXED HYPERLIPIDEMIA: ICD-10-CM

## 2021-04-06 DIAGNOSIS — I10 ESSENTIAL HYPERTENSION: ICD-10-CM

## 2021-04-06 DIAGNOSIS — I25.119 CORONARY ARTERY DISEASE INVOLVING NATIVE HEART WITH ANGINA PECTORIS, UNSPECIFIED VESSEL OR LESION TYPE (HCC): ICD-10-CM

## 2021-04-06 DIAGNOSIS — Z72.0 TOBACCO USE: ICD-10-CM

## 2021-04-06 PROCEDURE — 99214 OFFICE O/P EST MOD 30 MIN: CPT | Performed by: INTERNAL MEDICINE

## 2021-04-06 NOTE — PROGRESS NOTES
[facility] [place]  [address] [id number]  Cardiology Follow Up    Lucho Leos  1950  [id number]      1  New onset a-fib ([facility] [address] [facility] )     2  Coronary artery disease involving native heart with angina pectoris, unspecified vessel or lesion type ([email or web address] )     3  Essential hypertension     4  Tobacco use     5  Mixed hyperlipidemia         Chief Complaint   Patient presents with    Follow-up     discuss medications and testing ordered by PCP       Interval History:   Patient has known history of coronary artery disease hypertension hyperlipidemia COPD and smoking  Patient was evaluated by his new primary care physician recently and was found to be in atrial fibrillation  Patient was started on anticoagulation in the form of Eliquis  Patient does not have any history of cardiac arrhythmias in the past   Patient denies any history of palpitations or dizziness  Patient does have some shortness of breath which he attributes to COPD  Patient has history of smoking and has not been able to quit in spite of repeated counseling  Patient was supposed to get scheduled for echocardiogram as well as Holter monitor  Patient was also given a slip for carotid ultrasound by  PCP  Patient Active Problem List   Diagnosis    Coronary artery disease with angina pectoris ([email or web address]    Hypertension    Hyperlipidemia    Tobacco use    Pulmonary emphysema ([email or web address]    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 ([email or web address]    New onset atrial fibrillation (HCC)    Bruit of right carotid artery    Paresthesia of right arm     Past Medical History:   Diagnosis Date    Asbestosis ([email or web address]     Heart murmur     HTN (hypertension)     Hyperlipidemia     Myocardial infarction ([email or web address]     SOB (shortness of breath)      Social History     Socioeconomic History    Marital status:       Spouse name: Not on file    Number of children: Not on file    Years of education: Not on file    Highest education level: Not on file   Occupational History    Not on file   Social Needs    Financial resource strain: Not on file    Food insecurity     Worry: Not on file     Inability: Not on file    Transportation needs     Medical: Not on file     Non-medical: Not on file   Tobacco Use    Smoking status: Current Every Day Smoker     Packs/day: 1 00     Years: 61 00     Pack years: 61 00    Smokeless tobacco: Never Used   Substance and Sexual Activity    Alcohol use: No     Comment: stopped 6 yrs ago    Drug use: No    Sexual activity: Not Currently   Lifestyle    Physical activity     Days per week: Not on file     Minutes per session: Not on file    Stress: Not on file   Relationships    Social connections     Talks on phone: Not on file     Gets together: Not on file     Attends Zoroastrianism service: Not on file     Active member of club or organization: Not on file     Attends meetings of clubs or organizations: Not on file     Relationship status: Not on file    Intimate partner violence     Fear of current or ex partner: Not on file     Emotionally abused: Not on file     Physically abused: Not on file     Forced sexual activity: Not on file   Other Topics Concern    Not on file   Social History Narrative        Retired-    Hobbies-playing stock market    3 children    dentures      Family History   Problem Relation Age of Onset    Cancer Mother         unkn type    Dementia Sister     Heart murmur Son      Past Surgical History:   Procedure Laterality Date    CARDIAC CATHETERIZATION      CORONARY ANGIOPLASTY         Current Outpatient Medications:     amLODIPine (NORVASC) 10 mg tablet, Take 10 mg by mouth daily , Disp: , Rfl:     apixaban (ELIQUIS) 5 mg, Take 1 tablet (5 mg total) by mouth 2 (two) times a day, Disp: 60 tablet, Rfl: 3    atorvastatin (LIPITOR) 20 mg tablet, Take 20 mg by mouth daily , Disp: , Rfl:     benazepril (LOTENSIN) 40 MG tablet, Take 20 mg by mouth daily  , Disp: , Rfl:     diltiazem (CARTIA XT) 300 mg 24 hr capsule, Take 300 mg by mouth daily  , Disp: , Rfl:     ipratropium-albuterol (COMBIVENT RESPIMAT) inhaler, Inhale, Disp: , Rfl:     tiotropium-olodaterol (STIOLTO RESPIMAT) 2 5-2 5 MCG/ACT inhaler, Inhale 2 puffs daily, Disp: 1 Inhaler, Rfl: 0  Allergies   Allergen Reactions    Pollen Extract        Labs:  No visits with results within 2 Month(s) from this visit  Latest known visit with results is:   Hospital Outpatient Visit on 12/20/2018   Component Date Value    Protocol Name 12/20/2018 LEXISCAN-SIT     Time In Exercise Phase 12/20/2018 00:03:00     MAX  SYSTOLIC BP 17/17/3241 412     Max Diastolic Bp 78/20/0902 98     Max Heart Rate 12/20/2018 82     Max Predicted Heart Rate 12/20/2018 150     Reason for Termination 12/20/2018 Protocol Complete     Test Indication 12/20/2018                      Value:Chest Discomfort  CAD  ANGINA      Target Hr Formular 12/20/2018 (220 - Age)*85%      Imaging: No results found  Review of Systems:  Review of Systems     REVIEW OF SYSTEMS:  Constitutional:  Denies fever or chills   Eyes:  Denies change in visual acuity   HENT:  Denies nasal congestion or sore throat   Respiratory:   shortness of breath   Cardiovascular:  Denies chest pain or edema   GI:  Denies abdominal pain, nausea, vomiting, bloody stools or diarrhea   :  Denies dysuria, frequency, difficulty in micturition and nocturia  Musculoskeletal:  Denies back pain or joint pain   Neurologic:  Denies headache, focal weakness or sensory changes   Endocrine:  Denies polyuria or polydipsia   Lymphatic:  Denies swollen glands   Psychiatric:  Denies depression or anxiety     Physical Exam:    /80   Pulse 67   Ht 6' 3" (1 905 m)   Wt 108 kg (238 lb)   SpO2 92%   BMI 29 75 kg/m²     Physical Exam   PHYSICAL EXAM:  General:  Patient is not in acute distress   Head: Normocephalic, Atraumatic    HEENT:  Both pupils normal-size atraumatic, normocephalic, nonicteric  Neck:  JVP not raised  Trachea central   carotid bruit  Respiratory:   decreased breath sounds with scattered rhonchi  Cardiovascular:   Irregularly irregular  GI:  Abdomen soft nontender  No organomegaly  Lymphatic:  No cervical or inguinal lymphadenopathy  Neurologic:  Patient is awake alert, oriented   Grossly nonfocal    Extremities no edema    Discussion/Summary:   patient with new onset atrial fibrillation which was  Incidentally detected during routine examination  4 CHADS2 score 4    Lengthy discussion with patient regarding etiology and pathogenesis of atrial fibrillation  Patient understands the risks and benefits of anticoagulation to prevent thromboembolic risk  Patient does understand the risks of bleeding associated with anticoagulation  Patient will continue Eliquis for now  Echocardiogram to assess ejection fraction and rule out any evidence of structural heart disease  Holter monitor to assess AFib burden  Patient will get carotid ultrasound ordered by primary care physician  Further options for atrial fibrillation treatment will be discussed during next visit  Information booklet regarding atrial fibrillation provided to the patient  Patient is agreeable with the plan of care  Medications reviewed  Patient counseled to quit smoking to prevent future cardiovascular events

## 2021-04-07 ENCOUNTER — TELEPHONE (OUTPATIENT)
Dept: INTERNAL MEDICINE CLINIC | Facility: CLINIC | Age: 71
End: 2021-04-07

## 2021-04-07 NOTE — TELEPHONE ENCOUNTER
Spoke with patient    He will be by [facility] on Thursday to  samples of Xarelto 20 mg for 2 wks supply

## 2021-04-07 NOTE — TELEPHONE ENCOUNTER
Patient is taking the Eliquis 5 mg tablet 2 times a day  After an hour of taking this he gets very nausea  He also states it is raising his blood pressure  Today is was 156/82  Is there something else that can be prescribed? Please advise        Call back #[phone]

## 2021-04-08 ENCOUNTER — TELEPHONE (OUTPATIENT)
Dept: CARDIOLOGY CLINIC | Facility: CLINIC | Age: 71
End: 2021-04-08

## 2021-04-08 NOTE — TELEPHONE ENCOUNTER
Spoke with patient and informed him that Dr Maia Stewart would like for him to take his blood pressure medications  Patient stated he IS taking all his medications except for Eliquis  Patient has follow up with you on 4/19/21 at which time he will discuss anticoagulation medication with you  Patient is willing to take ASA 81mg daily if you would like him to

## 2021-04-08 NOTE — TELEPHONE ENCOUNTER
Spoke with pt  Kiko longo PA-C sent the rx for eliquis on 04/02/21  Started eliquis on Saturday   Pt did not take eliquis this morning  Pt stated the medication make his sick  Pt is not taking BP medications   Pt is only taking atorvastatin and inhales  Pt states he will take aspirin if that is okay  Pt will have a echo done on 04/12/21  Sees you on 04/16/21

## 2021-04-08 NOTE — TELEPHONE ENCOUNTER
Patient should take his blood pressure medications  As far as anticoagulation is concerned, we did discuss the risk of bleeding with Eliquis or any other anticoagulation and benefits of reduction in stroke  Ultimately, patient has to decide what he wants to do as long as he understands the risks and benefits  We can discuss further during next office visit

## 2021-04-08 NOTE — TELEPHONE ENCOUNTER
Harlan Stack into the office  He states that he does not want to take Eliquis or try the samples of xarelto  He has an appt with Dr Magdalena Villa on Monday  He states that his BP today was 163/92 Pulse - 60    Yesterday he stated that his BP was 140/99 Pulse 101    [phone]

## 2021-04-12 ENCOUNTER — HOSPITAL ENCOUNTER (OUTPATIENT)
Dept: NON INVASIVE DIAGNOSTICS | Facility: CLINIC | Age: 71
Discharge: HOME | End: 2021-04-12
Payer: MEDICARE

## 2021-04-12 DIAGNOSIS — I48.91 NEW ONSET A-FIB (HCC): ICD-10-CM

## 2021-04-12 PROCEDURE — 93306 TTE W/DOPPLER COMPLETE: CPT | Performed by: INTERNAL MEDICINE

## 2021-04-12 PROCEDURE — 93306 TTE W/DOPPLER COMPLETE: CPT

## 2021-04-12 PROCEDURE — 93225 XTRNL ECG REC<48 HRS REC: CPT

## 2021-04-12 PROCEDURE — 93226 XTRNL ECG REC<48 HR SCAN A/R: CPT

## 2021-04-13 PROCEDURE — 93227 XTRNL ECG REC<48 HR R&I: CPT | Performed by: INTERNAL MEDICINE

## 2021-04-14 ENCOUNTER — TELEPHONE (OUTPATIENT)
Dept: CARDIOLOGY CLINIC | Facility: CLINIC | Age: 71
End: 2021-04-14

## 2021-04-14 NOTE — TELEPHONE ENCOUNTER
----- Message from Jose M Andrews MD sent at 4/13/2021  5:03 PM EDT -----  Please call the patient  Holter monitor shows atrial fibrillation throughout  Patient does have an appointment next week  Will discuss further

## 2021-04-15 ENCOUNTER — TELEPHONE (OUTPATIENT)
Dept: CARDIOLOGY CLINIC | Facility: CLINIC | Age: 71
End: 2021-04-15

## 2021-04-15 ENCOUNTER — OFFICE VISIT (OUTPATIENT)
Dept: INTERNAL MEDICINE CLINIC | Facility: CLINIC | Age: 71
End: 2021-04-15
Payer: MEDICARE

## 2021-04-15 VITALS
BODY MASS INDEX: 29.34 KG/M2 | WEIGHT: 236 LBS | DIASTOLIC BLOOD PRESSURE: 82 MMHG | TEMPERATURE: 98 F | HEART RATE: 64 BPM | HEIGHT: 75 IN | OXYGEN SATURATION: 90 % | SYSTOLIC BLOOD PRESSURE: 132 MMHG

## 2021-04-15 DIAGNOSIS — Z87.898: ICD-10-CM

## 2021-04-15 DIAGNOSIS — I48.91 NEW ONSET ATRIAL FIBRILLATION (HCC): Primary | ICD-10-CM

## 2021-04-15 DIAGNOSIS — E78.2 MIXED HYPERLIPIDEMIA: ICD-10-CM

## 2021-04-15 DIAGNOSIS — I25.119 CORONARY ARTERY DISEASE INVOLVING NATIVE HEART WITH ANGINA PECTORIS, UNSPECIFIED VESSEL OR LESION TYPE (HCC): ICD-10-CM

## 2021-04-15 DIAGNOSIS — I10 ESSENTIAL HYPERTENSION: ICD-10-CM

## 2021-04-15 DIAGNOSIS — R09.89 BRUIT OF RIGHT CAROTID ARTERY: ICD-10-CM

## 2021-04-15 PROCEDURE — 99214 OFFICE O/P EST MOD 30 MIN: CPT | Performed by: PHYSICIAN ASSISTANT

## 2021-04-15 RX ORDER — BENAZEPRIL HYDROCHLORIDE 40 MG/1
20 TABLET, FILM COATED ORAL DAILY
Qty: 90 TABLET | Refills: 1 | Status: SHIPPED | OUTPATIENT
Start: 2021-04-15 | End: 2021-05-06 | Stop reason: ALTCHOICE

## 2021-04-15 RX ORDER — ATORVASTATIN CALCIUM 20 MG/1
20 TABLET, FILM COATED ORAL DAILY
Qty: 90 TABLET | Refills: 1 | Status: SHIPPED | OUTPATIENT
Start: 2021-04-15 | End: 2021-08-05 | Stop reason: SDUPTHER

## 2021-04-15 RX ORDER — AMLODIPINE BESYLATE 10 MG/1
10 TABLET ORAL DAILY
Qty: 90 TABLET | Refills: 1 | Status: SHIPPED | OUTPATIENT
Start: 2021-04-15 | End: 2021-08-05 | Stop reason: SDUPTHER

## 2021-04-15 RX ORDER — DILTIAZEM HYDROCHLORIDE 300 MG/1
300 CAPSULE, COATED, EXTENDED RELEASE ORAL DAILY
Qty: 90 CAPSULE | Refills: 1 | Status: SHIPPED | OUTPATIENT
Start: 2021-04-15 | End: 2021-08-05 | Stop reason: SDUPTHER

## 2021-04-15 NOTE — PATIENT INSTRUCTIONS
No change in current medication  Go to Cardiology office and  samples of Xarelto 20 mg and start taking 1 daily until you see Dr Abdon Gunter on Monday  We can ascertain whether not you will have any side effects  Have labs done if you are able to do so  Follow-up in 2-3 weeks to review all testing and consultations

## 2021-04-15 NOTE — TELEPHONE ENCOUNTER
Pt has stopped in    Pt has seen tuan PRADO today told him to come to the cardiology office  Patient Instructions   No change in current medication    Go to Cardiology office and  samples of Xarelto 20 mg and start taking 1 daily until you see Dr Lindsay Else on Monday

## 2021-04-19 ENCOUNTER — OFFICE VISIT (OUTPATIENT)
Dept: CARDIOLOGY CLINIC | Facility: CLINIC | Age: 71
End: 2021-04-19
Payer: MEDICARE

## 2021-04-19 VITALS
WEIGHT: 238 LBS | OXYGEN SATURATION: 92 % | HEART RATE: 56 BPM | SYSTOLIC BLOOD PRESSURE: 156 MMHG | BODY MASS INDEX: 29.59 KG/M2 | HEIGHT: 75 IN | DIASTOLIC BLOOD PRESSURE: 82 MMHG

## 2021-04-19 DIAGNOSIS — I48.91 NEW ONSET A-FIB (HCC): Primary | ICD-10-CM

## 2021-04-19 DIAGNOSIS — I25.119 CORONARY ARTERY DISEASE INVOLVING NATIVE HEART WITH ANGINA PECTORIS, UNSPECIFIED VESSEL OR LESION TYPE (HCC): ICD-10-CM

## 2021-04-19 DIAGNOSIS — Z72.0 TOBACCO USE: ICD-10-CM

## 2021-04-19 DIAGNOSIS — I10 ESSENTIAL HYPERTENSION: ICD-10-CM

## 2021-04-19 PROCEDURE — 99214 OFFICE O/P EST MOD 30 MIN: CPT | Performed by: INTERNAL MEDICINE

## 2021-04-19 NOTE — PROGRESS NOTES
[facility] [place]  [address]  [address] [facility] [place] [facility] [id number]  Cardiology Follow Up    Hermelinda Hernandez  1950  [id number]      1  New onset a-fib (HCC)  rivaroxaban (XARELTO) 20 mg tablet   2  Coronary artery disease involving native heart with angina pectoris, unspecified vessel or lesion type ([facility] )     3  Essential hypertension     4  Tobacco use         Chief Complaint   Patient presents with    Follow-up    Results     echo and holter       Interval History:   Patient presents for follow-up visit  Patient did not tolerate Eliquis  Patient was subsequently given samples of Xarelto to try  Patient seems to be tolerating it better  Patient had Holter monitor as well as echocardiogram   Patient is scheduled for carotid ultrasound as out patient  Patient denies any bleeding issues  Patient continues to smoke in spite of repeated counseling  No history of leg edema orthopnea PND  Patient does have COPD and uses inhalers  Patient Active Problem List   Diagnosis    Coronary artery disease with angina pectoris ([facility] )    Hypertension    Hyperlipidemia    Tobacco use    Pulmonary emphysema ([facility] )    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    New onset atrial fibrillation (HCC)    Bruit of right carotid artery    Paresthesia of right arm     Past Medical History:   Diagnosis Date    Asbestosis ([facility] )     Heart murmur     HTN (hypertension)     Hyperlipidemia     [facility]     SOB (shortness of breath)      Social History     Socioeconomic History    Marital status:       Spouse name: Not on file    Number of children: Not on file    Years of education: Not on file    Highest education level: Not on file   Occupational History    Not on file   Social Needs    Financial resource strain: Not on file    Food insecurity     Worry: Not on file     Inability: Not on file    Transportation needs     Medical: Not on file     Non-medical: Not on file   Tobacco Use    Smoking status: Current Every Day Smoker     Packs/day: 1 00     Years: 61 00     Pack years: 61 00    Smokeless tobacco: Never Used   Substance and Sexual Activity    Alcohol use: No     Comment: stopped 6 yrs ago    Drug use: No    Sexual activity: Not Currently   Lifestyle    Physical activity     Days per week: Not on file     Minutes per session: Not on file    Stress: Not on file   Relationships    Social connections     Talks on phone: Not on file     Gets together: Not on file     Attends Restorationism service: Not on file     Active member of club or organization: Not on file     Attends meetings of clubs or organizations: Not on file     Relationship status: Not on file    Intimate partner violence     Fear of current or ex partner: Not on file     Emotionally abused: Not on file     Physically abused: Not on file     Forced sexual activity: Not on file   Other Topics Concern    Not on file   Social History Narrative        Retired-    Hobbies-playing stock market    3 children    dentures      Family History   Problem Relation Age of Onset    Cancer Mother         unkn type    Dementia Sister     Heart murmur Son      Past Surgical History:   Procedure Laterality Date    CARDIAC CATHETERIZATION      CORONARY ANGIOPLASTY         Current Outpatient Medications:     amLODIPine (NORVASC) 10 mg tablet, Take 1 tablet (10 mg total) by mouth daily, Disp: 90 tablet, Rfl: 1    atorvastatin (LIPITOR) 20 mg tablet, Take 1 tablet (20 mg total) by mouth daily, Disp: 90 tablet, Rfl: 1    benazepril (LOTENSIN) 40 MG tablet, Take 0 5 tablets (20 mg total) by mouth daily, Disp: 90 tablet, Rfl: 1    diltiazem (Cartia XT) 300 mg 24 hr capsule, Take 1 capsule (300 mg total) by mouth daily, Disp: 90 capsule, Rfl: 1    ipratropium-albuterol (COMBIVENT RESPIMAT) inhaler, Inhale, Disp: , Rfl:     tiotropium-olodaterol (STIOLTO RESPIMAT) 2 5-2 5 MCG/ACT inhaler, Inhale 2 puffs daily, Disp: 1 Inhaler, Rfl: 0    rivaroxaban (XARELTO) 20 mg tablet, Take 1 tablet (20 mg total) by mouth daily, Disp: 30 tablet, Rfl: 5  Allergies   Allergen Reactions    Pollen Extract        Labs:  No visits with results within 2 Month(s) from this visit  Latest known visit with results is:   Hospital Outpatient Visit on 12/20/2018   Component Date Value    Protocol Name 12/20/2018 LEXISCAN-SIT     Time In Exercise Phase 12/20/2018 00:03:00     MAX  SYSTOLIC BP 75/49/4777 155     Max Diastolic Bp 45/25/9008 98     Max Heart Rate 12/20/2018 82     Max Predicted Heart Rate 12/20/2018 150     Reason for Termination 12/20/2018 Protocol Complete     Test Indication 12/20/2018                      Value:Chest Discomfort  CAD  ANGINA      Target Hr Formular 12/20/2018 (220 - Age)*85%      Imaging: No results found  Review of Systems:  Review of Systems     REVIEW OF SYSTEMS:  Constitutional:  Denies fever or chills   Eyes:  Denies change in visual acuity   HENT:  Denies nasal congestion or sore throat   Respiratory:   shortness of breath   Cardiovascular:  Denies chest pain or edema   GI:  Denies abdominal pain, nausea, vomiting, bloody stools or diarrhea   :  Denies dysuria, frequency, difficulty in micturition and nocturia  Musculoskeletal:  Denies back pain or joint pain   Neurologic:  Denies headache, focal weakness or sensory changes   Endocrine:  Denies polyuria or polydipsia   Lymphatic:  Denies swollen glands   Psychiatric:  Denies depression or anxiety     Physical Exam:    /82   Pulse 56   Ht 6' 3" (1 905 m)   Wt 108 kg (238 lb)   SpO2 92%   BMI 29 75 kg/m²     Physical Exam   PHYSICAL EXAM:  General:  Patient is not in acute distress   Head: Normocephalic, Atraumatic  HEENT:  Both pupils normal-size atraumatic, normocephalic, nonicteric  Neck:  JVP not raised   Trachea central  No carotid bruit  Respiratory:    Decreased breath sounds with scattered rhonchi  Cardiovascular:  Regular rate and rhythm no S3 no murmurs  GI:  Abdomen soft nontender  No organomegaly  Lymphatic:  No cervical or inguinal lymphadenopathy  Neurologic:  Patient is awake alert, oriented   Grossly nonfocal   extremities no edema    Discussion/Summary:   Patient with multiple medical problems who seems to be doing reasonably well from cardiac standpoint  Previous studies reviewed with patient  Medications reviewed and possible side effects discussed  concepts of cardiovascular disease , signs and symptoms of heart disease  Dietary and risk factor modification reinforced  All questions answered  Safety measures reviewed  Patient advised to report any problems prompting medical attention  Holter monitor showed atrial fibrillation throughout  Echocardiogram showed normal ejection  With mild aortic stenosis  Lengthy discussion with patient again regarding risks and benefits of anticoagulation  Patient is willing to continue Xarelto at this point  Patient understands the risks of bleeding as well as benefits of reduction in stroke  If patient is able to maintain on anticoagulation for a while, other possible options of treatment for atrial fibrillation can be discussed  Patient counseled about hazards of smoking to prevent future cardiovascular events  Follow-up with carotid ultrasound and follow-up with primary care physician  Follow-up in 2 months or earlier as needed  Patient is agreeable with the plan of care

## 2021-04-21 DIAGNOSIS — I10 ESSENTIAL HYPERTENSION: ICD-10-CM

## 2021-04-21 RX ORDER — BENAZEPRIL HYDROCHLORIDE 20 MG/1
20 TABLET ORAL DAILY
COMMUNITY
End: 2021-08-05 | Stop reason: SDUPTHER

## 2021-04-30 ENCOUNTER — HOSPITAL ENCOUNTER (OUTPATIENT)
Dept: NON INVASIVE DIAGNOSTICS | Facility: CLINIC | Age: 71
Discharge: HOME | End: 2021-04-30
Payer: MEDICARE

## 2021-04-30 DIAGNOSIS — R09.89 BRUIT OF RIGHT CAROTID ARTERY: ICD-10-CM

## 2021-04-30 PROCEDURE — 93880 EXTRACRANIAL BILAT STUDY: CPT

## 2021-04-30 PROCEDURE — 93880 EXTRACRANIAL BILAT STUDY: CPT | Performed by: SURGERY

## 2021-05-06 ENCOUNTER — OFFICE VISIT (OUTPATIENT)
Dept: INTERNAL MEDICINE CLINIC | Facility: CLINIC | Age: 71
End: 2021-05-06
Payer: MEDICARE

## 2021-05-06 VITALS
WEIGHT: 238 LBS | SYSTOLIC BLOOD PRESSURE: 120 MMHG | RESPIRATION RATE: 16 BRPM | HEIGHT: 75 IN | DIASTOLIC BLOOD PRESSURE: 82 MMHG | TEMPERATURE: 98 F | OXYGEN SATURATION: 94 % | HEART RATE: 54 BPM | BODY MASS INDEX: 29.59 KG/M2

## 2021-05-06 DIAGNOSIS — I10 ESSENTIAL HYPERTENSION: ICD-10-CM

## 2021-05-06 DIAGNOSIS — I48.91 NEW ONSET ATRIAL FIBRILLATION (HCC): Primary | ICD-10-CM

## 2021-05-06 DIAGNOSIS — I65.22 ASYMPTOMATIC STENOSIS OF LEFT CAROTID ARTERY: ICD-10-CM

## 2021-05-06 DIAGNOSIS — E78.2 MIXED HYPERLIPIDEMIA: ICD-10-CM

## 2021-05-06 DIAGNOSIS — Z72.0 TOBACCO USE: ICD-10-CM

## 2021-05-06 PROCEDURE — 99214 OFFICE O/P EST MOD 30 MIN: CPT | Performed by: PHYSICIAN ASSISTANT

## 2021-05-06 NOTE — PATIENT INSTRUCTIONS
I will talk to Cardiology about reducing Xarelto dose to 10 mg as current 20 mg doses causing patient to bleed excessively  Will then discuss with patient  Will refer patient to vascular surgery for following of left carotid stenosis  Schedule follow-up here in 3 months, sooner as needed

## 2021-05-06 NOTE — PROGRESS NOTES
Assessment/Plan:   Patient Instructions   I will talk to Cardiology about reducing Xarelto dose to 10 mg as current 20 mg doses causing patient to bleed excessively  Will then discuss with patient  Will refer patient to vascular surgery for following of left carotid stenosis  Schedule follow-up here in 3 months, sooner as needed  Quality Measures:       Return in about 3 months (around 8/6/2021) for Next scheduled follow up  Diagnoses and all orders for this visit:    New onset atrial fibrillation [facility])    Essential hypertension    Mixed hyperlipidemia    Asymptomatic stenosis of left carotid artery  -     Ambulatory referral to Vascular Surgery; Future    Tobacco use          Subjective:      Patient ID: Ban Mays is a 65 y o  male  Follow-up , labs reviewed with patient today    New onset atrial fibrillation:  Had been tried on Eliquis but did not tolerate  On Xarelto 20 mg daily having difficulty with easy bruising, and bleeding, nose bleeds, skin bleeding from simple tear and having trouble stopping the bleeding  Patient very frustrated over this  No complaint of chest pain or feelings of palpitations  Hypertension:  Very good control on his current medications  Jerrica cp, palp, sob, HA, dizziness, diaphoresis, syncope, visual disturbance  Noting some slight edema of his right ankle compared to left  Hyperlipidemia: decent control on his current medication  Atorvastatin 20 mg daily  COPD:  Using Combivent  Does not want to use any other medication  Tobacco abuse:  Has told me out right that he does not want and will not stop smoking  Left carotid stenosis:  50-69% by ultrasound  Has agreed to be go to vascular surgery for consultation  I am sure will most likely be put on routine follow-up  Asymptomatic        ALLERGIES:  Allergies   Allergen Reactions    Pollen Extract        CURRENT MEDICATIONS:    Current Outpatient Medications:     amLODIPine (NORVASC) 10 mg tablet, Take 1 tablet (10 mg total) by mouth daily, Disp: 90 tablet, Rfl: 1    atorvastatin (LIPITOR) 20 mg tablet, Take 1 tablet (20 mg total) by mouth daily, Disp: 90 tablet, Rfl: 1    benazepril (LOTENSIN) 20 mg tablet, Take 20 mg by mouth daily, Disp: , Rfl:     diltiazem (Cartia XT) 300 mg 24 hr capsule, Take 1 capsule (300 mg total) by mouth daily, Disp: 90 capsule, Rfl: 1    ipratropium-albuterol (COMBIVENT RESPIMAT) inhaler, Inhale, Disp: , Rfl:     rivaroxaban (XARELTO) 20 mg tablet, Take 1 tablet (20 mg total) by mouth daily, Disp: 30 tablet, Rfl: 5    tiotropium-olodaterol (STIOLTO RESPIMAT) 2 5-2 5 MCG/ACT inhaler, Inhale 2 puffs daily, Disp: 1 Inhaler, Rfl: 0    ACTIVE PROBLEM LIST:  Patient Active Problem List   Diagnosis    Coronary artery disease with angina pectoris ([facility] )    Hypertension    Hyperlipidemia    Tobacco use    Pulmonary emphysema (HCC)    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 (HCC)    New onset atrial fibrillation (HCC)    Bruit of right carotid artery    Paresthesia of right arm       PAST MEDICAL HISTORY:  Past Medical History:   Diagnosis Date    Asbestosis ([facility] )     Heart murmur     HTN (hypertension)     Hyperlipidemia     Myocardial infarction (HCC)     SOB (shortness of breath)        PAST SURGICAL HISTORY:  Past Surgical History:   Procedure Laterality Date    CARDIAC CATHETERIZATION      CORONARY ANGIOPLASTY         FAMILY HISTORY:  Family History   Problem Relation Age of Onset    Cancer Mother         unkn type    Dementia Sister     Heart murmur Son        SOCIAL HISTORY:  Social History     Socioeconomic History    Marital status:       Spouse name: Not on file    Number of children: Not on file    Years of education: Not on file    Highest education level: Not on file   Occupational History    Not on file   Social Needs    Financial resource strain: Not on file    Food insecurity     Worry: Not on file Inability: Not on file    Transportation needs     Medical: Not on file     Non-medical: Not on file   Tobacco Use    Smoking status: Current Every Day Smoker     Packs/day: 1 00     Years: 61 00     Pack years: 61 00    Smokeless tobacco: Never Used   Substance and Sexual Activity    Alcohol use: No     Comment: stopped 6 yrs ago    Drug use: No    Sexual activity: Not Currently   Lifestyle    Physical activity     Days per week: Not on file     Minutes per session: Not on file    Stress: Not on file   Relationships    Social connections     Talks on phone: Not on file     Gets together: Not on file     Attends Yazidi service: Not on file     Active member of club or organization: Not on file     Attends meetings of clubs or organizations: Not on file     Relationship status: Not on file    Intimate partner violence     Fear of current or ex partner: Not on file     Emotionally abused: Not on file     Physically abused: Not on file     Forced sexual activity: Not on file   Other Topics Concern    Not on file   Social History Narrative        Retired-    Hobbies-playing stock market    3 children    dentures       Review of Systems   Constitutional: Negative for activity change, chills, fatigue and fever  HENT: Negative for congestion  Eyes: Negative for discharge and visual disturbance  Respiratory: Positive for cough (  Chronic)  Negative for chest tightness and shortness of breath  Cardiovascular: Negative for chest pain, palpitations and leg swelling  Gastrointestinal: Negative for abdominal pain  Endocrine: Negative for polydipsia, polyphagia and polyuria  Genitourinary: Negative for difficulty urinating  Musculoskeletal: Negative for arthralgias and myalgias  Skin: Negative for rash  Allergic/Immunologic: Negative for immunocompromised state  Neurological: Negative for dizziness, syncope, weakness, light-headedness and headaches     Hematological: Negative for adenopathy  Does not bruise/bleed easily  Psychiatric/Behavioral: Negative for dysphoric mood, sleep disturbance and suicidal ideas  The patient is not nervous/anxious  Objective:  Vitals:    21 0756 21 0823   BP: 160/82 120/82   BP Location: Right arm Left arm   Patient Position: Sitting Sitting   Cuff Size: Standard    Pulse: (!) 54    Resp: 16    Temp: 98 °F (36 7 °C)    TempSrc: Tympanic    SpO2: 94%    Weight: 108 kg (238 lb)    Height: 6' 3" (1 905 m)      Body mass index is 29 75 kg/m²  Physical Exam  Vitals signs and nursing note reviewed  Constitutional:       General: He is not in acute distress  Appearance: He is well-developed  HENT:      Head: Normocephalic and atraumatic  Eyes:      Pupils: Pupils are equal, round, and reactive to light  Neck:      Musculoskeletal: Neck supple  Thyroid: No thyromegaly  Vascular: Carotid bruit ( bilateral, left greater than right) present  No JVD  Cardiovascular:      Rate and Rhythm: Normal rate  Rhythm irregularly irregular  Heart sounds: Murmur ( aortic area) present  Systolic murmur present with a grade of 2/6  Pulmonary:      Effort: Pulmonary effort is normal  No respiratory distress  Breath sounds: Normal breath sounds  Musculoskeletal:      Right lower leg: No edema  Left lower le+ Edema present  Lymphadenopathy:      Cervical: No cervical adenopathy  Skin:     General: Skin is warm and dry  Findings: No rash  Neurological:      General: No focal deficit present  Mental Status: He is alert and oriented to person, place, and time  Mental status is at baseline  Psychiatric:         Mood and Affect: Mood normal          Behavior: Behavior normal            RESULTS:    No results found for this or any previous visit (from the past 1008 hour(s))  This note was created with voice recognition software    Phonic, grammatical and spelling errors may be present within the note as a result

## 2021-05-13 ENCOUNTER — TELEPHONE (OUTPATIENT)
Dept: CARDIOLOGY CLINIC | Facility: CLINIC | Age: 71
End: 2021-05-13

## 2021-05-13 NOTE — TELEPHONE ENCOUNTER
Patient stated that has been experiencing nose bleeds (after blowing nose noticed pink strands), eyes hurt periodically, joints hurt, right hand finger tips tingle, and xcessive bruising  Printed out a list of Xarelto symptoms and had a majority of symptoms listed  Denied any other symptoms  Patient stated that he would like to wait until scheduled appt to further discuss

## 2021-05-13 NOTE — TELEPHONE ENCOUNTER
Pt stopped in to give back samples of Xarelto & stated that he is not taking it anymore  He said that every time he even bumps his arm/hand lightly it bleeds & is sore  & he also stated that it feels like he has an elephant on his back

## 2021-06-24 ENCOUNTER — OFFICE VISIT (OUTPATIENT)
Dept: CARDIOLOGY CLINIC | Facility: CLINIC | Age: 71
End: 2021-06-24
Payer: MEDICARE

## 2021-06-24 VITALS
HEIGHT: 75 IN | WEIGHT: 236 LBS | DIASTOLIC BLOOD PRESSURE: 84 MMHG | BODY MASS INDEX: 29.34 KG/M2 | OXYGEN SATURATION: 94 % | HEART RATE: 98 BPM | SYSTOLIC BLOOD PRESSURE: 158 MMHG

## 2021-06-24 DIAGNOSIS — Z72.0 TOBACCO USE: ICD-10-CM

## 2021-06-24 DIAGNOSIS — I48.91 NEW ONSET A-FIB (HCC): Primary | ICD-10-CM

## 2021-06-24 DIAGNOSIS — I25.119 CORONARY ARTERY DISEASE INVOLVING NATIVE HEART WITH ANGINA PECTORIS, UNSPECIFIED VESSEL OR LESION TYPE (HCC): ICD-10-CM

## 2021-06-24 DIAGNOSIS — I10 ESSENTIAL HYPERTENSION: ICD-10-CM

## 2021-06-24 PROCEDURE — 99214 OFFICE O/P EST MOD 30 MIN: CPT | Performed by: INTERNAL MEDICINE

## 2021-06-24 RX ORDER — PREDNISONE 10 MG/1
1 TABLET ORAL 2 TIMES DAILY
COMMUNITY
End: 2021-06-25 | Stop reason: SDUPTHER

## 2021-06-24 NOTE — PROGRESS NOTES
[facility] [place]  [address] [id number]  Cardiology Follow Up    Virgie Baires  1950  [id number]      1  New onset a-fib ([facility])     2  Coronary artery disease involving native heart with angina pectoris, unspecified vessel or lesion type ([facility])     3  Essential hypertension     4  Tobacco use         Chief Complaint   Patient presents with    Follow-up       Interval History:  Patient presents for follow-up visit  Patient does have history of atrial fibrillation which is relatively new  Patient tried Eliquis and Xarelto  States that he could not tolerate  He does not want to be considered for anticoagulation  His carotid ultrasound showed moderate carotid artery stenosis  He is supposed to see vascular surgery  He continues to smoke in spite of repeated counseling  He states that he has been compliant with all his present medications however  Patient Active Problem List   Diagnosis    Coronary artery disease with angina pectoris ([facility])    Hypertension    Hyperlipidemia    Tobacco use    Pulmonary emphysema ([facility])    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 ([facility])    New onset atrial fibrillation (HCC)    Bruit of right carotid artery    Paresthesia of right arm     Past Medical History:   Diagnosis Date    Asbestosis ([facility])     Heart murmur     HTN (hypertension)     Hyperlipidemia     Myocardial infarction ([facility])     SOB (shortness of breath)      Social History     Socioeconomic History    Marital status:       Spouse name: Not on file    Number of children: Not on file    Years of education: Not on file    Highest education level: Not on file   Occupational History    Not on file   Tobacco Use    Smoking status: Current Every Day Smoker     Packs/day: 1 00     Years: 61 00     Pack years: 61 00    Smokeless tobacco: Never Used   Vaping Use    Vaping Use: Never used   Substance and Sexual Activity    Alcohol use: No     Comment: stopped 6 yrs ago    Drug use: No    Sexual activity: Not Currently   Other Topics Concern    Not on file   Social History Narrative        Retired-    Hobbies-playing stock market    3 children    dentures     Social Determinants of Health     Financial Resource Strain:     Difficulty of Paying Living Expenses:    Food Insecurity:     Worried About Running Out of Food in the Last Year:     Ran Out of Food in the Last Year:    Transportation Needs:     Lack of Transportation (Medical):      Lack of Transportation (Non-Medical):    Physical Activity:     Days of Exercise per Week:     Minutes of Exercise per Session:    Stress:     Feeling of Stress :    Social Connections:     Frequency of Communication with Friends and Family:     Frequency of Social Gatherings with Friends and Family:     Attends Taoist Services:     Active Member of Clubs or Organizations:     Attends Club or Organization Meetings:     Marital Status:    Intimate Partner Violence:     Fear of Current or Ex-Partner:     Emotionally Abused:     Physically Abused:     Sexually Abused:       Family History   Problem Relation Age of Onset    Cancer Mother         unkn type    Dementia Sister     Heart murmur Son      Past Surgical History:   Procedure Laterality Date    CARDIAC CATHETERIZATION      CORONARY ANGIOPLASTY         Current Outpatient Medications:     amLODIPine (NORVASC) 10 mg tablet, Take 1 tablet (10 mg total) by mouth daily, Disp: 90 tablet, Rfl: 1    atorvastatin (LIPITOR) 20 mg tablet, Take 1 tablet (20 mg total) by mouth daily, Disp: 90 tablet, Rfl: 1    benazepril (LOTENSIN) 20 mg tablet, Take 20 mg by mouth daily, Disp: , Rfl:     diltiazem (Cartia XT) 300 mg 24 hr capsule, Take 1 capsule (300 mg total) by mouth daily, Disp: 90 capsule, Rfl: 1    ipratropium-albuterol (COMBIVENT RESPIMAT) inhaler, Inhale, Disp: , Rfl:     predniSONE 10 mg tablet, Take 1 mg by mouth 2 (two) times a day If needed for shortness of breath and wheezing, Disp: , Rfl:     tiotropium-olodaterol (STIOLTO RESPIMAT) 2 5-2 5 MCG/ACT inhaler, Inhale 2 puffs daily, Disp: 1 Inhaler, Rfl: 0  Allergies   Allergen Reactions    Pollen Extract        Labs:  No visits with results within 2 Month(s) from this visit  Latest known visit with results is:   Hospital Outpatient Visit on 12/20/2018   Component Date Value    Protocol Name 12/20/2018 LEXISCAN-SIT     Time In Exercise Phase 12/20/2018 00:03:00     MAX  SYSTOLIC BP 21/41/7345 687     Max Diastolic Bp 47/53/3011 98     Max Heart Rate 12/20/2018 82     Max Predicted Heart Rate 12/20/2018 150     Reason for Termination 12/20/2018 Protocol Complete     Test Indication 12/20/2018                      Value:Chest Discomfort  CAD  ANGINA      Target Hr Formular 12/20/2018 (220 - Age)*85%      Imaging: No results found  Review of Systems:  Review of Systems     REVIEW OF SYSTEMS:  Constitutional:  Denies fever or chills   Eyes:  Denies change in visual acuity   HENT:  Denies nasal congestion or sore throat   Respiratory:  cough or shortness of breath   Cardiovascular:  Denies chest pain or edema   GI:  Denies abdominal pain, nausea, vomiting, bloody stools or diarrhea   :  Denies dysuria, frequency, difficulty in micturition and nocturia  Musculoskeletal:  Denies back pain or joint pain   Neurologic:  Denies headache, focal weakness or sensory changes   Endocrine:  Denies polyuria or polydipsia   Lymphatic:  Denies swollen glands   Psychiatric:  Denies depression or anxiety     Physical Exam:    /84   Pulse 98   Ht 6' 3" (1 905 m)   Wt 107 kg (236 lb)   SpO2 94%   BMI 29 50 kg/m²     Physical Exam   PHYSICAL EXAM:  General:  Patient is not in acute distress   Head: Normocephalic, Atraumatic  HEENT:  Both pupils normal-size atraumatic, normocephalic, nonicteric  Neck:  JVP not raised   Trachea central  No carotid bruit  Respiratory: decreased breath sounds bilateral  Cardiovascular:  rregularly irregular  GI:  Abdomen soft nontender  No organomegaly  Lymphatic:  No cervical or inguinal lymphadenopathy  Neurologic:  Patient is awake alert, oriented   Grossly nonfocal    Extremities no edema    Discussion/Summary:   Patient with multiple medical problems who seems to be doing reasonably well from cardiac standpoint  Previous studies reviewed with patient  Medications reviewed and possible side effects discussed  concepts of cardiovascular disease , signs and symptoms of heart disease  Dietary and risk factor modification reinforced  All questions answered  Safety measures reviewed  Patient advised to report any problems prompting medical attention  As regards atrial fibrillation, lengthy discussion with patient again regarding risks and benefits of anticoagulation  Patient does not want to be considered for anticoagulation  He clearly understands the risks of stroke/   Thromboembolic risk associated with atrial fibrillation  I also offered him evaluation for possible consideration for Watchman device  He absolutely refuses  patient had a few questions which were answered  Follow-up with primary care physician  Followup in 6 months or earlier as needed

## 2021-06-25 DIAGNOSIS — R06.02 SOB (SHORTNESS OF BREATH): Primary | ICD-10-CM

## 2021-06-25 RX ORDER — PREDNISONE 10 MG/1
10 TABLET ORAL 2 TIMES DAILY
Qty: 30 TABLET | Refills: 1 | Status: SHIPPED | OUTPATIENT
Start: 2021-06-25 | End: 2021-06-28 | Stop reason: SDUPTHER

## 2021-06-25 NOTE — TELEPHONE ENCOUNTER
MG has been confirmed  27 Day  [facility]    Pt had an appt w/ Dr Yap Innocent on 6/24/21 & pt said that the pharm didn't get the req for the med

## 2021-06-28 RX ORDER — PREDNISONE 10 MG/1
10 TABLET ORAL 2 TIMES DAILY
Qty: 64 TABLET | Refills: 3 | Status: SHIPPED | OUTPATIENT
Start: 2021-06-28 | End: 2021-12-17 | Stop reason: SDUPTHER

## 2021-07-22 ENCOUNTER — TELEPHONE (OUTPATIENT)
Dept: INTERNAL MEDICINE CLINIC | Facility: CLINIC | Age: 71
End: 2021-07-22

## 2021-07-23 NOTE — TELEPHONE ENCOUNTER
Hi Guan are you ok with sending this in?
I sent it in
Left a detailed message letting the patient know
Patient is requesting a script for combivent respimat inhaler 4g 20/100, use 2 inhalations three times a day as needed for wheezing, or 5 inhalers equals 90 day supply  Requesting 3 refills  [facility]    This was originally prescribed by Dr Sarah Hardy and he retired in December       Call back #[phone]
Yes, he has been on this for years
Take tylenol 650mg every 6-8 hours as needed for pain.  Avoid any strenuous activity.  Follow up with your PMD in 2 days.  Follow up with an orthopedic within 1-2 weeks.  Return to ED for any worsening pain, swelling, redness or fever.

## 2021-07-26 DIAGNOSIS — J43.9 PULMONARY EMPHYSEMA, UNSPECIFIED EMPHYSEMA TYPE (HCC): ICD-10-CM

## 2021-07-26 RX ORDER — IPRATROPIUM/ALBUTEROL SULFATE 20-100 MCG
2 MIST INHALER (GRAM) INHALATION 3 TIMES DAILY
Qty: 20 G | Refills: 3 | Status: SHIPPED | OUTPATIENT
Start: 2021-07-26 | End: 2021-07-28 | Stop reason: SDUPTHER

## 2021-07-27 ENCOUNTER — CONSULT (OUTPATIENT)
Dept: VASCULAR SURGERY | Facility: CLINIC | Age: 71
End: 2021-07-27
Payer: MEDICARE

## 2021-07-27 VITALS
BODY MASS INDEX: 29.34 KG/M2 | HEIGHT: 75 IN | WEIGHT: 236 LBS | SYSTOLIC BLOOD PRESSURE: 148 MMHG | HEART RATE: 103 BPM | DIASTOLIC BLOOD PRESSURE: 80 MMHG | TEMPERATURE: 99.7 F

## 2021-07-27 DIAGNOSIS — I65.22 ASYMPTOMATIC STENOSIS OF LEFT CAROTID ARTERY: ICD-10-CM

## 2021-07-27 DIAGNOSIS — I65.23 BILATERAL CAROTID ARTERY STENOSIS: Primary | ICD-10-CM

## 2021-07-27 DIAGNOSIS — E78.2 MIXED HYPERLIPIDEMIA: ICD-10-CM

## 2021-07-27 DIAGNOSIS — I10 ESSENTIAL HYPERTENSION: ICD-10-CM

## 2021-07-27 DIAGNOSIS — Z72.0 TOBACCO USE: ICD-10-CM

## 2021-07-27 PROCEDURE — 99204 OFFICE O/P NEW MOD 45 MIN: CPT | Performed by: PHYSICIAN ASSISTANT

## 2021-07-27 NOTE — PROGRESS NOTES
Assessment/Plan:    Asymptomatic stenosis of left carotid artery  Bilateral carotid artery stenosis  -     Ambulatory referral to Vascular Surgery  -     VAS carotid complete study; Future    -asymptomatic carotid artery stenosis    -CV du 4/30/21     R 74/22/1 17; L 50-69% 153/47/1 76    Discussion:  Patient with asymptomatic carotid artery stenosis  We reviewed his carotid duplex study which shows the left internal carotid artery falls in the moderate stenotic range  There is no indication for surgery at this time  He also has atrial fibrillation for which he should be on anticoagulation to reduce the risk of stroke however he reports side effects of weakness  Will defer anticoagulation issues to Cardiology and his family doctors  In terms carotid disease, recommend routine duplex surveillance twice annually and periodic office visit  We reviewed the symptoms of stroke for which he should call 911  We discussed the importance of optimal medical therapy  LDL is stable at 85, but he might benefit from intensified statin therapy  -continue with aspirin and atorvastatin  -smoking cessation was strongly recommended but he is not ready to quit and states he has been smoking since age 4  -continue with risk factor modification for comorbid conditions  -recommend routine duplex surveillance twice a year and annual office visit (or sooner if needed)  -patient education regarding cerebrovascular and cardiovascular disease was provided  -call to be seen sooner if any concerns      Essential hypertension    Mixed hyperlipidemia    Atrial fibrillation    Tobacco use      Subjective:      Patient ID: Uriel Lopez is a 65 y o  male  Patientis new and referred by Ember Fuentes PA-C  Patient is here to rev CV done on 4/30/21  Patient denies TIA or stroke like symptoms  Patient is taking atorvastatin and smokes 1 5 ppd      HPI    Uriel Lopez is a 65 y o  male hypertension, hyperlipidemia, coronary artery disease (cor stent around '01), mild aortic stenosis, smoking, AF (not on anticoagulation), emphysema who is referred for evaluation of carotid artery stenosis  Mr Melina Benitez is found to have carotid artery stenosis by screening duplex  Patient denies history of TIA or stroke  We reviewed the symptoms of stroke for which she should call 911  We reviewed the carotid duplex study which shows moderate stenosis in the left internal carotid artery by velocity criteria  There is mild plaquing on the right  We discussed the recommendation for optimal medical therapy and routine duplex surveillance  Patient tells me that he takes a baby aspirin on most days unless he is on steroids (COPD) at which time he holds the aspirin  He is currently taking steroids  Mr Melina Benitez tells me that he had a small heart attack 20 years ago by enzyme criteria  At that time he had 2 vessel coronary disease and had 1 vessel stented  He is inactive but denies any exertional chest pain  He has no claudication  Unfortunately, he continues to smoke about 1 5 packs per day and is not ready to quit  He also has atrial fibrillation tells me that he was unable to tolerate Eliquis or Xarelto so he stopped these medications as own  We discussed the importance of anticoagulation to reduce the risk of stroke in atrial fibrillation  Despite high risk of stroke, he does not want to be on a blood thinner  He also tells me that he has problems with insurance so he is not sure that he could get all the recommended medications and surveillance testing  Further, if he were to need a carotid surgery at some point he is not certain that he would want to proceed  We reviewed recent labs and testing       Prednisone -> swelling legs    States that Eliquis and Xarelto too many side effects and caused muscle aches to him    Atorvastatin 20 LDL 85          CV duplex 4/30/21    [facility] REPORT  CLINICAL:  Indications:  Patient presents with a cervical bruit and multiple  cardiovascular risk factors  Patient is asymptomatic from a cerebral vascular  standpoint  Operative History:  Cardiac angioplasty  Risk Factors  The patient has history of HTN and HLD  Clinical  Right Pressure:  156/82 mm Hg, Left Pressure:  156/82 mm Hg  FINDINGS:     Right        Impression  PSV  EDV (cm/s)  Direction of Flow  Ratio    Dist  ICA                 57          20                      0 90    Mid  ICA                  86          20                      1 36    Prox  ICA    1 - 49%      74          22                      1 17    Dist CCA                  52          16                              Mid CCA                   63          13                      0 73    Prox CCA                  87          14                              Ext Carotid               92          12                      1 44    Prox Vert                 54          14  Antegrade                   Subclavian               121           0                                 Left         Impression  PSV  EDV (cm/s)  Direction of Flow  Ratio    Dist  ICA                 60          24                      0 69    Mid  ICA                  83          35                      0 96    Prox  ICA    50 - 69%    153          47                      1 76    Dist CCA                  60          16                              Mid CCA                   87          16                      1 26    Prox CCA                  69          15                              Ext Carotid              125          18                      1 44    Prox Vert                 40           9  Antegrade                   Subclavian               163          11                                       CONCLUSION:  Impression  RIGHT:  There is <50% stenosis noted in the internal carotid artery  Plaque is  heterogenous and irregula  Vertebral artery flow is antegrade  There is no significant subclavian artery  disease  LEFT:  There is 50-69% stenosis noted in the internal carotid artery  Plaque is  heterogenous and irregular/calcified  Vertebral artery flow is antegrade  There is no significant subclavian artery  disease  Tech note:  Study was performed by student under direct supervision from a registered  senior vascular technologist with approval from patient  Recommend repeat testing in 6 months as per protocol unless otherwise  Indicated  The following portions of the patient's history were reviewed and updated as appropriate: allergies, current medications, past family history, past medical history, past social history, past surgical history and problem list     Review of Systems   Constitutional: Negative  HENT: Negative  Eyes: Negative  Respiratory: Negative  Cardiovascular: Negative  Gastrointestinal: Negative  Endocrine: Negative  Genitourinary: Negative  Musculoskeletal: Negative  Skin: Negative  Allergic/Immunologic: Negative  Neurological: Negative  Hematological: Negative  Psychiatric/Behavioral: Negative  Objective:      /80 (BP Location: Right arm, Patient Position: Sitting, Cuff Size: Standard)   Pulse 103   Temp 99 7 °F (37 6 °C) (Tympanic)   Ht 6' 3" (1 905 m)   Wt 107 kg (236 lb)   BMI 29 50 kg/m²       No carotid bruits    Cough, hoarse, sl decreased BS  2+ pedal edema, mild LE edema       Physical Exam  Vitals and nursing note reviewed  Constitutional:       Appearance: He is well-developed  HENT:      Head: Normocephalic and atraumatic  Eyes:      Pupils: Pupils are equal, round, and reactive to light  Neck:      Thyroid: No thyromegaly  Vascular: No JVD  Trachea: Trachea normal    Cardiovascular:      Rate and Rhythm: Normal rate  Rhythm irregularly irregular  Pulses:           Carotid pulses are 2+ on the right side and 2+ on the left side         Radial pulses are 2+ on the right side and 2+ on the left side         Dorsalis pedis pulses are 2+ on the right side and 2+ on the left side  Heart sounds: Normal heart sounds, S1 normal and S2 normal  No murmur heard  No friction rub  No gallop  Pulmonary:      Effort: Pulmonary effort is normal  No accessory muscle usage or respiratory distress  Breath sounds: No wheezing or rales  Comments: Sl decreased BS  Abdominal:      General: Bowel sounds are normal  There is no distension  Palpations: Abdomen is soft  Tenderness: There is no abdominal tenderness  Musculoskeletal:         General: No deformity  Normal range of motion  Cervical back: Neck supple  Right lower leg: Edema present  Left lower leg: Edema present  Skin:     General: Skin is warm and dry  Findings: No lesion or rash  Nails: There is no clubbing  Neurological:      Mental Status: He is alert and oriented to person, place, and time  Comments: Grossly normal    Psychiatric:         Behavior: Behavior is cooperative  I have reviewed and made appropriate changes to the review of systems input by the medical assistant      Vitals:    07/27/21 1418   BP: 148/80   BP Location: Right arm   Patient Position: Sitting   Cuff Size: Standard   Pulse: 103   Temp: 99 7 °F (37 6 °C)   TempSrc: Tympanic   Weight: 107 kg (236 lb)   Height: 6' 3" (1 905 m)       Patient Active Problem List   Diagnosis    Coronary artery disease with angina pectoris ([address] )    Hypertension    Hyperlipidemia    Tobacco use    Pulmonary emphysema (HCC)    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 (HCC)    New onset atrial fibrillation (HCC)    Bruit of right carotid artery    Paresthesia of right arm    Bilateral carotid artery stenosis       Past Surgical History:   Procedure Laterality Date    CARDIAC CATHETERIZATION      CORONARY ANGIOPLASTY         Family History   Problem Relation Age of Onset    Cancer Mother         unkn type  Dementia Sister     Heart murmur Son        Social History     Socioeconomic History    Marital status:      Spouse name: Not on file    Number of children: Not on file    Years of education: Not on file    Highest education level: Not on file   Occupational History    Not on file   Tobacco Use    Smoking status: Current Every Day Smoker     Packs/day: 1 50     Years: 61 00     Pack years: 91 50    Smokeless tobacco: Never Used   Vaping Use    Vaping Use: Never used   Substance and Sexual Activity    Alcohol use: No     Comment: stopped 6 yrs ago    Drug use: No    Sexual activity: Not Currently   Other Topics Concern    Not on file   Social History Narrative        Retired-    Hobbies-playing stock market    3 children    dentures     Social Determinants of Health     Financial Resource Strain:     Difficulty of Paying Living Expenses:    Food Insecurity:     Worried About Running Out of Food in the Last Year:     Ran Out of Food in the Last Year:    Transportation Needs:     Lack of Transportation (Medical):  Lack of Transportation (Non-Medical):    Physical Activity:     Days of Exercise per Week:     Minutes of Exercise per Session:    Stress:     Feeling of Stress :    Social Connections:     Frequency of Communication with Friends and Family:     Frequency of Social Gatherings with Friends and Family:     Attends Denominational Services:     Active Member of Clubs or Organizations:     Attends Club or Organization Meetings:     Marital Status:    Intimate Partner Violence:     Fear of Current or Ex-Partner:     Emotionally Abused:     Physically Abused:     Sexually Abused:         Allergies   Allergen Reactions    Pollen Extract          Current Outpatient Medications:     amLODIPine (NORVASC) 10 mg tablet, Take 1 tablet (10 mg total) by mouth daily, Disp: 90 tablet, Rfl: 1    atorvastatin (LIPITOR) 20 mg tablet, Take 1 tablet (20 mg total) by mouth daily, Disp: 90 tablet, Rfl: 1    benazepril (LOTENSIN) 20 mg tablet, Take 20 mg by mouth daily, Disp: , Rfl:     diltiazem (Cartia XT) 300 mg 24 hr capsule, Take 1 capsule (300 mg total) by mouth daily, Disp: 90 capsule, Rfl: 1    ipratropium-albuterol (Combivent Respimat) inhaler, Inhale 2 puffs 3 (three) times a day, Disp: 20 g, Rfl: 3    predniSONE 10 mg tablet, Take 1 tablet (10 mg total) by mouth 2 (two) times a day If needed for shortness of breath and wheezing, Disp: 64 tablet, Rfl: 3    tiotropium-olodaterol (STIOLTO RESPIMAT) 2 5-2 5 MCG/ACT inhaler, Inhale 2 puffs daily, Disp: 1 Inhaler, Rfl: 0

## 2021-07-27 NOTE — PATIENT INSTRUCTIONS
Carotid artery disease    -continue with aspirin and atorvastatin  -we reviewed the symptoms of stroke for which he should call 911  -recommend routine duplex surveillance twice a year and annual office visit  -work on quitting smoking  -call to be seen sooner if any concerns        Symptoms of stroke:  - Unable to speak or understand speech  - Unable to move one side of the body (arm or leg)  - Visual changes  - Call 911 for any symptoms of stroke

## 2021-07-28 DIAGNOSIS — J43.9 PULMONARY EMPHYSEMA, UNSPECIFIED EMPHYSEMA TYPE (HCC): ICD-10-CM

## 2021-07-28 RX ORDER — IPRATROPIUM/ALBUTEROL SULFATE 20-100 MCG
2 MIST INHALER (GRAM) INHALATION 3 TIMES DAILY
Qty: 20 G | Refills: 3 | Status: SHIPPED | OUTPATIENT
Start: 2021-07-28

## 2021-08-05 ENCOUNTER — TELEPHONE (OUTPATIENT)
Dept: INTERNAL MEDICINE CLINIC | Facility: CLINIC | Age: 71
End: 2021-08-05

## 2021-08-05 DIAGNOSIS — I10 ESSENTIAL HYPERTENSION: ICD-10-CM

## 2021-08-05 DIAGNOSIS — E78.2 MIXED HYPERLIPIDEMIA: ICD-10-CM

## 2021-08-05 RX ORDER — ATORVASTATIN CALCIUM 20 MG/1
20 TABLET, FILM COATED ORAL DAILY
Qty: 90 TABLET | Refills: 1 | Status: SHIPPED | OUTPATIENT
Start: 2021-08-05 | End: 2021-08-06 | Stop reason: SDUPTHER

## 2021-08-05 RX ORDER — DILTIAZEM HYDROCHLORIDE 300 MG/1
300 CAPSULE, COATED, EXTENDED RELEASE ORAL DAILY
Qty: 90 CAPSULE | Refills: 1 | Status: SHIPPED | OUTPATIENT
Start: 2021-08-05 | End: 2021-08-06 | Stop reason: SDUPTHER

## 2021-08-05 RX ORDER — AMLODIPINE BESYLATE 10 MG/1
10 TABLET ORAL DAILY
Qty: 90 TABLET | Refills: 1 | Status: SHIPPED | OUTPATIENT
Start: 2021-08-05 | End: 2021-08-06 | Stop reason: SDUPTHER

## 2021-08-05 RX ORDER — BENAZEPRIL HYDROCHLORIDE 20 MG/1
20 TABLET ORAL DAILY
Qty: 90 TABLET | Refills: 1 | Status: SHIPPED | OUTPATIENT
Start: 2021-08-05 | End: 2021-08-06 | Stop reason: SDUPTHER

## 2021-08-05 NOTE — TELEPHONE ENCOUNTER
Diltiazem 300 mg 24 hr capsule-take 1 capsule by mouth daily     Amlodipine 10 mg tablet-take 1 tablet by mouth daily    Benazepril 20 mg tablet-take 1 tablet by mouth daily  This medication has to be for ONLY 20 mg!!!     Atorvastatin 20 mg tablet-take 1 tablet by mouth daily      All for a 90 day supply with 3 refills    [facility]

## 2021-08-06 DIAGNOSIS — E78.2 MIXED HYPERLIPIDEMIA: ICD-10-CM

## 2021-08-06 DIAGNOSIS — I10 ESSENTIAL HYPERTENSION: ICD-10-CM

## 2021-08-06 RX ORDER — DILTIAZEM HYDROCHLORIDE 300 MG/1
300 CAPSULE, COATED, EXTENDED RELEASE ORAL DAILY
Qty: 90 CAPSULE | Refills: 3 | Status: SHIPPED | OUTPATIENT
Start: 2021-08-06

## 2021-08-06 RX ORDER — AMLODIPINE BESYLATE 10 MG/1
10 TABLET ORAL DAILY
Qty: 90 TABLET | Refills: 3 | Status: SHIPPED | OUTPATIENT
Start: 2021-08-06

## 2021-08-06 RX ORDER — BENAZEPRIL HYDROCHLORIDE 20 MG/1
20 TABLET ORAL DAILY
Qty: 90 TABLET | Refills: 3 | Status: SHIPPED | OUTPATIENT
Start: 2021-08-06

## 2021-08-06 RX ORDER — ATORVASTATIN CALCIUM 20 MG/1
20 TABLET, FILM COATED ORAL DAILY
Qty: 90 TABLET | Refills: 3 | Status: SHIPPED | OUTPATIENT
Start: 2021-08-06 | End: 2021-12-17 | Stop reason: SDUPTHER

## 2021-08-13 ENCOUNTER — OFFICE VISIT (OUTPATIENT)
Dept: INTERNAL MEDICINE CLINIC | Facility: CLINIC | Age: 71
End: 2021-08-13
Payer: MEDICARE

## 2021-08-13 VITALS
SYSTOLIC BLOOD PRESSURE: 124 MMHG | HEART RATE: 67 BPM | DIASTOLIC BLOOD PRESSURE: 68 MMHG | OXYGEN SATURATION: 96 % | RESPIRATION RATE: 16 BRPM | WEIGHT: 241 LBS | HEIGHT: 75 IN | BODY MASS INDEX: 29.97 KG/M2 | TEMPERATURE: 98.5 F

## 2021-08-13 DIAGNOSIS — Z12.5 SCREENING FOR PROSTATE CANCER: ICD-10-CM

## 2021-08-13 DIAGNOSIS — J43.9 PULMONARY EMPHYSEMA, UNSPECIFIED EMPHYSEMA TYPE (HCC): ICD-10-CM

## 2021-08-13 DIAGNOSIS — I48.91 NEW ONSET ATRIAL FIBRILLATION (HCC): ICD-10-CM

## 2021-08-13 DIAGNOSIS — I65.23 BILATERAL CAROTID ARTERY STENOSIS: ICD-10-CM

## 2021-08-13 DIAGNOSIS — I10 ESSENTIAL HYPERTENSION: Primary | ICD-10-CM

## 2021-08-13 DIAGNOSIS — E78.2 MIXED HYPERLIPIDEMIA: ICD-10-CM

## 2021-08-13 PROCEDURE — 99214 OFFICE O/P EST MOD 30 MIN: CPT | Performed by: PHYSICIAN ASSISTANT

## 2021-08-13 PROCEDURE — G0438 PPPS, INITIAL VISIT: HCPCS | Performed by: PHYSICIAN ASSISTANT

## 2021-08-13 PROCEDURE — 1123F ACP DISCUSS/DSCN MKR DOCD: CPT | Performed by: PHYSICIAN ASSISTANT

## 2021-08-13 RX ORDER — IPRATROPIUM BROMIDE AND ALBUTEROL SULFATE 2.5; .5 MG/3ML; MG/3ML
3 SOLUTION RESPIRATORY (INHALATION) 4 TIMES DAILY
Qty: 360 ML | Refills: 3 | Status: SHIPPED | OUTPATIENT
Start: 2021-08-13 | End: 2021-08-16 | Stop reason: SDUPTHER

## 2021-08-13 NOTE — PROGRESS NOTES
Assessment/Plan:      Will have labs repeated in October and schedule follow-up visit after they are done to discuss  No other medication changes at this time  Will send prescription for DuoNebs to his mail-in pharmacy  Quality Measures:       Return in about 2 months (around 10/25/2021) for Next scheduled follow up  Diagnoses and all orders for this visit:    Essential hypertension  -     CBC and differential; Future  -     Comprehensive metabolic panel; Future    New onset atrial fibrillation (HCC)    Bilateral carotid artery stenosis    Mixed hyperlipidemia  -     Lipid panel; Future    Screening for prostate cancer  -     PSA, Total Screen; Future    Pulmonary emphysema, unspecified emphysema type ([facility] [address] )  -     ipratropium-albuterol (DUO-NEB) 0 5-2 5 mg/3 mL nebulizer solution; Take 3 mL by nebulization 4 (four) times a day          Subjective:      Patient ID: Meghna Moser is a 65 y o  male  Follow-up     Atrial fibrillation:  Cardiology note reviewed  Patient refusing to take any anticoagulants stating that they have cause side effects of abdominal pain or muscle pain that was severe and uncomfortable  It has been reviewed with him of the risks of no anticoagulation such as stroke, DVT, heart attack  Patient is aware and accepting of the risks  Hypertension:  Decent control on current medication  No complaint of chest pain or palpitations at this time  Hyperlipidemia:  On atorvastatin 20 mg daily  Most recent labs show improvement in his LDL down to 85  I did discuss with him what it would benefit him to have LDLs lower and that I would like to increase his atorvastatin to 40 mg daily  He would like to have his labs repeated in October and then discuss this further  COPD:  Baseline shortness of breath is unchanged  Chronic cough  Continues to smoke  No desire to stop  I discussed with him low-dose CT lung screening, patient refused      Carotid artery disease:  Has been seen by vascular  Was unimpressed by his visit, felt it was very uncoordinated and does not want to go back  Understands he should have his carotids monitor every 6 months, but is not sure he wants to do this  I did discuss also abdominal aortic aneurysm monitoring he does not want to do some        ALLERGIES:  Allergies   Allergen Reactions    Pollen Extract        CURRENT MEDICATIONS:    Current Outpatient Medications:     amLODIPine (NORVASC) 10 mg tablet, Take 1 tablet (10 mg total) by mouth daily, Disp: 90 tablet, Rfl: 3    atorvastatin (LIPITOR) 20 mg tablet, Take 1 tablet (20 mg total) by mouth daily, Disp: 90 tablet, Rfl: 3    benazepril (LOTENSIN) 20 mg tablet, Take 1 tablet (20 mg total) by mouth daily, Disp: 90 tablet, Rfl: 3    diltiazem (Cartia XT) 300 mg 24 hr capsule, Take 1 capsule (300 mg total) by mouth daily, Disp: 90 capsule, Rfl: 3    ipratropium-albuterol (Combivent Respimat) inhaler, Inhale 2 puffs 3 (three) times a day, Disp: 20 g, Rfl: 3    tiotropium-olodaterol (STIOLTO RESPIMAT) 2 5-2 5 MCG/ACT inhaler, Inhale 2 puffs daily, Disp: 1 Inhaler, Rfl: 0    ipratropium-albuterol (DUO-NEB) 0 5-2 5 mg/3 mL nebulizer solution, Take 3 mL by nebulization 4 (four) times a day, Disp: 360 mL, Rfl: 3    predniSONE 10 mg tablet, Take 1 tablet (10 mg total) by mouth 2 (two) times a day If needed for shortness of breath and wheezing (Patient not taking: Reported on 8/13/2021), Disp: 64 tablet, Rfl: 3    ACTIVE PROBLEM LIST:  Patient Active Problem List   Diagnosis    Coronary artery disease with angina pectoris ([facility] )    Hypertension    Hyperlipidemia    Tobacco use    Pulmonary emphysema (HCC)    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 (HCC)    New onset atrial fibrillation (HCC)    Bruit of right carotid artery    Paresthesia of right arm    Bilateral carotid artery stenosis       PAST MEDICAL HISTORY:  Past Medical History:   Diagnosis Date    Asbestosis ([facility] )     Heart murmur     HTN (hypertension)     Hyperlipidemia     Myocardial infarction (HCC)     SOB (shortness of breath)        PAST SURGICAL HISTORY:  Past Surgical History:   Procedure Laterality Date    CARDIAC CATHETERIZATION      CORONARY ANGIOPLASTY         FAMILY HISTORY:  Family History   Problem Relation Age of Onset    Cancer Mother         unkn type    Dementia Sister     Heart murmur Son        SOCIAL HISTORY:  Social History     Socioeconomic History    Marital status:      Spouse name: Not on file    Number of children: Not on file    Years of education: Not on file    Highest education level: Not on file   Occupational History    Not on file   Tobacco Use    Smoking status: Current Every Day Smoker     Packs/day: 1 50     Years: 61 00     Pack years: 91 50    Smokeless tobacco: Never Used   Vaping Use    Vaping Use: Never used   Substance and Sexual Activity    Alcohol use: No     Comment: stopped 6 yrs ago    Drug use: No    Sexual activity: Not Currently   Other Topics Concern    Not on file   Social History Narrative        Retired-    Hobbies-playing stock market    3 children    dentures     Social Determinants of Health     Financial Resource Strain:     Difficulty of Paying Living Expenses:    Food Insecurity:     Worried About Running Out of Food in the Last Year:     Ran Out of Food in the Last Year:    Transportation Needs:     Lack of Transportation (Medical):      Lack of Transportation (Non-Medical):    Physical Activity:     Days of Exercise per Week:     Minutes of Exercise per Session:    Stress:     Feeling of Stress :    Social Connections:     Frequency of Communication with Friends and Family:     Frequency of Social Gatherings with Friends and Family:     Attends Nondenominational Services:     Active Member of Clubs or Organizations:     Attends Club or Organization Meetings:     Marital Status:    Intimate Partner Violence:     Fear of Current or Ex-Partner:     Emotionally Abused:     Physically Abused:     Sexually Abused:        Review of Systems   Constitutional: Negative for activity change, chills, fatigue and fever  HENT: Negative for congestion  Eyes: Negative for discharge  Respiratory: Positive for cough, shortness of breath and wheezing  Negative for chest tightness  Cardiovascular: Negative for chest pain, palpitations and leg swelling  Gastrointestinal: Negative for abdominal pain and blood in stool  Genitourinary: Negative for difficulty urinating  Musculoskeletal: Negative for arthralgias and myalgias  Skin: Negative for rash  Allergic/Immunologic: Negative for immunocompromised state  Neurological: Negative for dizziness, syncope, weakness, light-headedness and headaches  Hematological: Negative for adenopathy  Does not bruise/bleed easily  Psychiatric/Behavioral: Negative for dysphoric mood  The patient is not nervous/anxious  Objective:  Vitals:    08/13/21 0758   BP: 124/68   BP Location: Right arm   Patient Position: Sitting   Cuff Size: Standard   Pulse: 67   Resp: 16   Temp: 98 5 °F (36 9 °C)   TempSrc: Tympanic   SpO2: 96%   Weight: 109 kg (241 lb)   Height: 6' 3" (1 905 m)     Body mass index is 30 12 kg/m²  Physical Exam  Vitals and nursing note reviewed  Constitutional:       General: He is not in acute distress  Appearance: He is well-developed  He is not ill-appearing  Comments: Some baseline conversational dyspnea  Intermittent coughing  HENT:      Head: Normocephalic and atraumatic  Eyes:      Pupils: Pupils are equal, round, and reactive to light  Neck:      Thyroid: No thyromegaly  Vascular: No carotid bruit or JVD  Cardiovascular:      Rate and Rhythm: Normal rate  Rhythm irregularly irregular  Heart sounds: Normal heart sounds  Pulmonary:      Effort: Pulmonary effort is normal  No respiratory distress        Comments: Decreased breath sounds throughout with scattered rhonchi and wheezing  Prolonged expiratory phase  Expiratory wheezing  Musculoskeletal:      Cervical back: Neck supple  Right lower leg: Edema ( trace edema) present  Left lower leg: Edema ( trace edema) present  Lymphadenopathy:      Cervical: No cervical adenopathy  Skin:     General: Skin is warm and dry  Findings: No rash  Neurological:      General: No focal deficit present  Mental Status: He is alert and oriented to person, place, and time  Mental status is at baseline  Psychiatric:         Mood and Affect: Mood normal          Behavior: Behavior normal            RESULTS:    No results found for this or any previous visit (from the past 1008 hour(s))  This note was created with voice recognition software  Phonic, grammatical and spelling errors may be present within the note as a result

## 2021-08-13 NOTE — PROGRESS NOTES
Assessment and Plan: Will have labs repeated in October and schedule follow-up visit after they are done to discuss  No other medication changes at this time  Will send prescription for DuoNebs to his mail-in pharmacy  Problem List Items Addressed This Visit        Respiratory    Pulmonary emphysema ([facility] )    Relevant Medications    ipratropium-albuterol (DUO-NEB) 0 5-2 5 mg/3 mL nebulizer solution       Cardiovascular and Mediastinum    Hypertension - Primary    Relevant Orders    CBC and differential    Comprehensive metabolic panel    New onset atrial fibrillation (HCC)    Bilateral carotid artery stenosis       Other    Hyperlipidemia    Relevant Orders    Lipid panel      Other Visit Diagnoses     Screening for prostate cancer        Relevant Orders    PSA, Total Screen           Preventive health issues were discussed with patient, and age appropriate screening tests were ordered as noted in patient's After Visit Summary  Personalized health advice and appropriate referrals for health education or preventive services given if needed, as noted in patient's After Visit Summary  History of Present Illness:     Patient presents for Medicare Annual Wellness visit  Questionnaire has been reviewed, clarified, and updated with the patient today      Patient Care Team:  Tashi Colby MD as PCP - General (Internal Medicine)  Jody Shen MD     Problem List:     Patient Active Problem List   Diagnosis    Coronary artery disease with angina pectoris ([facility] )    Hypertension    Hyperlipidemia    Tobacco use    Pulmonary emphysema ([facility] )    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 ([facility] )    New onset atrial fibrillation (HCC)    Bruit of right carotid artery    Paresthesia of right arm    Bilateral carotid artery stenosis      Past Medical and Surgical History:     Past Medical History:   Diagnosis Date    [facility]bestosis ([facility]     Heart murmur     HTN (hypertension)     Hyperlipidemia     Myocardial infarction (HCC)     SOB (shortness of breath)      Past Surgical History:   Procedure Laterality Date    CARDIAC CATHETERIZATION      CORONARY ANGIOPLASTY        Family History:     Family History   Problem Relation Age of Onset   Mustapha Debra Cancer Mother         unkn type    Dementia Sister     Heart murmur Son       Social History:     Social History     Socioeconomic History    Marital status:      Spouse name: None    Number of children: None    Years of education: None    Highest education level: None   Occupational History    None   Tobacco Use    Smoking status: Current Every Day Smoker     Packs/day: 1 50     Years: 61 00     Pack years: 91 50    Smokeless tobacco: Never Used   Vaping Use    Vaping Use: Never used   Substance and Sexual Activity    Alcohol use: No     Comment: stopped 6 yrs ago    Drug use: No    Sexual activity: Not Currently   Other Topics Concern    None   Social History Narrative        Retired-    Hobbies-playing stock market    3 children    dentures     Social Determinants of Health     Financial Resource Strain:     Difficulty of Paying Living Expenses:    Food Insecurity:     Worried About Running Out of Food in the Last Year:     Ran Out of Food in the Last Year:    Transportation Needs:     Lack of Transportation (Medical):      Lack of Transportation (Non-Medical):    Physical Activity:     Days of Exercise per Week:     Minutes of Exercise per Session:    Stress:     Feeling of Stress :    Social Connections:     Frequency of Communication with Friends and Family:     Frequency of Social Gatherings with Friends and Family:     Attends L[employer] day Services:     Active Member of Clubs or Organizations:     Attends Club or Organization Meetings:     Marital Status:    Intimate Partner Violence:     Fear of Current or Ex-Partner:     Emotionally Abused:     Physically Abused:     Sexually Abused: Medications and Allergies:     Current Outpatient Medications   Medication Sig Dispense Refill    amLODIPine (NORVASC) 10 mg tablet Take 1 tablet (10 mg total) by mouth daily 90 tablet 3    atorvastatin (LIPITOR) 20 mg tablet Take 1 tablet (20 mg total) by mouth daily 90 tablet 3    benazepril (LOTENSIN) 20 mg tablet Take 1 tablet (20 mg total) by mouth daily 90 tablet 3    diltiazem (Cartia XT) 300 mg 24 hr capsule Take 1 capsule (300 mg total) by mouth daily 90 capsule 3    ipratropium-albuterol (Combivent Respimat) inhaler Inhale 2 puffs 3 (three) times a day 20 g 3    tiotropium-olodaterol (STIOLTO RESPIMAT) 2 5-2 5 MCG/ACT inhaler Inhale 2 puffs daily 1 Inhaler 0    ipratropium-albuterol (DUO-NEB) 0 5-2 5 mg/3 mL nebulizer solution Take 3 mL by nebulization 4 (four) times a day 360 mL 3    predniSONE 10 mg tablet Take 1 tablet (10 mg total) by mouth 2 (two) times a day If needed for shortness of breath and wheezing (Patient not taking: Reported on 8/13/2021) 64 tablet 3     No current facility-administered medications for this visit       Allergies   Allergen Reactions    Pollen Extract       Immunizations:     Immunization History   Administered Date(s) Administered    INFLUENZA 12/14/2018, 10/18/2019, 10/15/2020, 08/09/2021    Influenza Split 01/16/2013    Influenza, seasonal, injectable, preservative free 10/05/2011    SARS-CoV-2 / COVID-19 mRNA IM (Pfizer-BioNTech) 05/04/2021, 05/25/2021    Tdap 11/11/2016    influenza, trivalent, adjuvanted 12/14/2018      Health Maintenance:         Topic Date Due    Hepatitis C Screening  Never done    Lung Cancer Screening  Never done    Colorectal Cancer Screening  Never done         Topic Date Due    Pneumococcal Vaccine: 65+ Years (1 of 2 - PPSV23) Never done      Medicare Health Risk Assessment:     /68 (BP Location: Right arm, Patient Position: Sitting, Cuff Size: Standard)   Pulse 67   Temp 98 5 °F (36 9 °C) (Tympanic)   Resp 16   Ht 6' 3" (1 905 m)   Wt 109 kg (241 lb)   SpO2 96%   BMI 30 12 kg/m²      Leonora Christy is here for his Subsequent Wellness visit  Health Risk Assessment:   Patient rates overall health as good  Patient feels that their physical health rating is same  Patient is satisfied with their life  Eyesight was rated as same  Hearing was rated as same  Patient feels that their emotional and mental health rating is same  Patients states they are never, rarely angry  Patient states they are never, rarely unusually tired/fatigued  Pain experienced in the last 7 days has been none  Patient states that he has experienced no weight loss or gain in last 6 months  Depression Screening:   PHQ-2 Score: 0      Fall Risk Screening: In the past year, patient has experienced: no history of falling in past year      Home Safety:  Patient does not have trouble with stairs inside or outside of their home  Patient has working smoke alarms and has working carbon monoxide detector  Home safety hazards include: none  Nutrition:   Current diet is Regular  Medications:   Patient is not currently taking any over-the-counter supplements  Patient is able to manage medications  Activities of Daily Living (ADLs)/Instrumental Activities of Daily Living (IADLs):   Walk and transfer into and out of bed and chair?: Yes  Dress and groom yourself?: Yes    Bathe or shower yourself?: Yes    Feed yourself? Yes  Do your laundry/housekeeping?: Yes  Manage your money, pay your bills and track your expenses?: Yes  Make your own meals?: Yes    Do your own shopping?: Yes    Previous Hospitalizations:   Any hospitalizations or ED visits within the last 12 months?: No      Advance Care Planning:   Living will: Yes    Durable POA for healthcare:  Yes    Advanced directive: Yes      Comments: Does not want to share with emr    Cognitive Screening:   Provider or family/friend/caregiver concerned regarding cognition?: No    PREVENTIVE SCREENINGS Cardiovascular Screening:    General: Screening Not Indicated, History Lipid Disorder and Screening Current      Diabetes Screening:     General: Screening Current      Colorectal Cancer Screening:     General: Patient Declines      Prostate Cancer Screening:    General: Screening Current      Osteoporosis Screening:    General: Screening Not Indicated      Abdominal Aortic Aneurysm (AAA) Screening:    Risk factors include: age between 73-67 yo and tobacco use        General: Patient Declines      Lung Cancer Screening:     General: Patient Declines      Hepatitis C Screening:    General: Patient Declines    Screening, Brief Intervention, and Referral to Treatment (SBIRT)    Screening  Typical number of drinks in a day: 0  Typical number of drinks in a week: 0  Interpretation: Low risk drinking behavior  Single Item Drug Screening:  How often have you used an illegal drug (including marijuana) or a prescription medication for non-medical reasons in the past year? never    Single Item Drug Screen Score: 0  Interpretation: Negative screen for possible drug use disorder    Brief Intervention  Alcohol & drug use screenings were reviewed  No concerns regarding substance use disorder identified         Adelaide Saldaña PA-C

## 2021-08-13 NOTE — PATIENT INSTRUCTIONS
Will have labs repeated in October and schedule follow-up visit after they are done to discuss  No other medication changes at this time  Will send prescription for Mirandajoanna to his mail-in pharmacy  Medicare Preventive Visit Patient Instructions  Thank you for completing your Welcome to Medicare Visit or Medicare Annual Wellness Visit today  Your next wellness visit will be due in one year (8/14/2022)  The screening/preventive services that you may require over the next 5-10 years are detailed below  Some tests may not apply to you based off risk factors and/or age  Screening tests ordered at today's visit but not completed yet may show as past due  Also, please note that scanned in results may not display below  Preventive Screenings:  Service Recommendations Previous Testing/Comments   Colorectal Cancer Screening  · Colonoscopy    · Fecal Occult Blood Test (FOBT)/Fecal Immunochemical Test (FIT)  · Fecal DNA/Cologuard Test  · Flexible Sigmoidoscopy Age: 54-65 years old   Colonoscopy: every 10 years (May be performed more frequently if at higher risk)  OR  FOBT/FIT: every 1 year  OR  Cologuard: every 3 years  OR  Sigmoidoscopy: every 5 years  Screening may be recommended earlier than age 48 if at higher risk for colorectal cancer  Also, an individualized decision between you and your healthcare provider will decide whether screening between the ages of 74-80 would be appropriate   Colonoscopy: Not on file  FOBT/FIT: Not on file  Cologuard: Not on file  Sigmoidoscopy: Not on file          Prostate Cancer Screening Individualized decision between patient and health care provider in men between ages of 53-78   Medicare will cover every 12 months beginning on the day after your 50th birthday PSA: No results in last 5 years           Hepatitis C Screening Once for adults born between [facility]  More frequently in patients at high risk for Hepatitis C Hep C Antibody: Not on file        Diabetes Screening 1-2 times per year if you're at risk for diabetes or have pre-diabetes Fasting glucose: No results in last 5 years   A1C: No results in last 5 years        Cholesterol Screening Once every 5 years if you don't have a lipid disorder  May order more often based on risk factors  Lipid panel: 10/16/2020    Screening Not Indicated  History Lipid Disorder      Other Preventive Screenings Covered by Medicare:  1  Abdominal Aortic Aneurysm (AAA) Screening: covered once if your at risk  You're considered to be at risk if you have a family history of AAA or a male between the age of 73-68 who smoking at least 100 cigarettes in your lifetime  2  Lung Cancer Screening: covers low dose CT scan once per year if you meet all of the following conditions: (1) Age 50-69; (2) No signs or symptoms of lung cancer; (3) Current smoker or have quit smoking within the last 15 years; (4) You have a tobacco smoking history of at least 30 pack years (packs per day x number of years you smoked); (5) You get a written order from a healthcare provider  3  Glaucoma Screening: covered annually if you're considered high risk: (1) You have diabetes OR (2) Family history of glaucoma OR (3)  aged 48 and older OR (3)  American aged 72 and older  3  Osteoporosis Screening: covered every 2 years if you meet one of the following conditions: (1) Have a vertebral abnormality; (2) On glucocorticoid therapy for more than 3 months; (3) Have primary hyperparathyroidism; (4) On osteoporosis medications and need to assess response to drug therapy  5  HIV Screening: covered annually if you're between the age of 12-76  Also covered annually if you are younger than 13 and older than 72 with risk factors for HIV infection  For pregnant patients, it is covered up to 3 times per pregnancy      Immunizations:  Immunization Recommendations   Influenza Vaccine Annual influenza vaccination during flu season is recommended for all persons aged >= 6 months who do not have contraindications   Pneumococcal Vaccine (Prevnar and Pneumovax)  * Prevnar = PCV13  * Pneumovax = PPSV23 Adults 25-60 years old: 1-3 doses may be recommended based on certain risk factors  Adults 72 years old: Prevnar (PCV13) vaccine recommended followed by Pneumovax (PPSV23) vaccine  If already received PPSV23 since turning 65, then PCV13 recommended at least one year after PPSV23 dose  Hepatitis B Vaccine 3 dose series if at intermediate or high risk (ex: diabetes, end stage renal disease, liver disease)   Tetanus (Td) Vaccine - COST NOT COVERED BY MEDICARE PART B Following completion of primary series, a booster dose should be given every 10 years to maintain immunity against tetanus  Td may also be given as tetanus wound prophylaxis  Tdap Vaccine - COST NOT COVERED BY MEDICARE PART B Recommended at least once for all adults  For pregnant patients, recommended with each pregnancy  Shingles Vaccine (Shingrix) - COST NOT COVERED BY MEDICARE PART B  2 shot series recommended in those aged 48 and above     Health Maintenance Due:      Topic Date Due    Hepatitis C Screening  Never done    Lung Cancer Screening  Never done    Colorectal Cancer Screening  Never done     Immunizations Due:      Topic Date Due    Pneumococcal Vaccine: 65+ Years (1 of 2 - PPSV23) Never done     Advance Directives   What are advance directives? Advance directives are legal documents that state your wishes and plans for medical care  These plans are made ahead of time in case you lose your ability to make decisions for yourself  Advance directives can apply to any medical decision, such as the treatments you want, and if you want to donate organs  What are the types of advance directives? There are many types of advance directives, and each state has rules about how to use them  You may choose a combination of any of the following:  · Living will:   This is a written record of the treatment you want  You can also choose which treatments you do not want, which to limit, and which to stop at a certain time  This includes surgery, medicine, IV fluid, and tube feedings  · Durable power of  for healthcare [facility]): This is a written record that states who you want to make healthcare choices for you when you are unable to make them for yourself  This person, called a proxy, is usually a family member or a friend  You may choose more than 1 proxy  · Do not resuscitate (DNR) order:  A DNR order is used in case your heart stops beating or you stop breathing  It is a request not to have certain forms of treatment, such as CPR  A DNR order may be included in other types of advance directives  · Medical directive: This covers the care that you want if you are in a coma, near death, or unable to make decisions for yourself  You can list the treatments you want for each condition  Treatment may include pain medicine, surgery, blood transfusions, dialysis, IV or tube feedings, and a ventilator (breathing machine)  · Values history: This document has questions about your views, beliefs, and how you feel and think about life  This information can help others choose the care that you would choose  Why are advance directives important? An advance directive helps you control your care  Although spoken wishes may be used, it is better to have your wishes written down  Spoken wishes can be misunderstood, or not followed  Treatments may be given even if you do not want them  An advance directive may make it easier for your family to make difficult choices about your care  Cigarette Smoking and Your Health   Risks to your health if you smoke:  Nicotine and other chemicals found in tobacco damage every cell in your body  Even if you are a light smoker, you have an increased risk for cancer, heart disease, and lung disease  If you are pregnant or have diabetes, smoking increases your risk for complications  Benefits to your health if you stop smoking:   · You decrease respiratory symptoms such as coughing, wheezing, and shortness of breath  · You reduce your risk for cancers of the lung, mouth, throat, kidney, bladder, pancreas, stomach, and cervix  If you already have cancer, you increase the benefits of chemotherapy  You also reduce your risk for cancer returning or a second cancer from developing  · You reduce your risk for heart disease, blood clots, heart attack, and stroke  · You reduce your risk for lung infections, and diseases such as pneumonia, asthma, chronic bronchitis, and emphysema  · Your circulation improves  More oxygen can be delivered to your body  If you have diabetes, you lower your risk for complications, such as kidney, artery, and eye diseases  You also lower your risk for nerve damage  Nerve damage can lead to amputations, poor vision, and blindness  · You improve your body's ability to heal and to fight infections  For more information and support to stop smoking:   · [employer]  Phone: 4- 013 - 836-8264  Web Address: [employer]  Weight Management   Why it is important to manage your weight:  Being overweight increases your risk of health conditions such as heart disease, high blood pressure, type 2 diabetes, and certain types of cancer  It can also increase your risk for osteoarthritis, sleep apnea, and other respiratory problems  Aim for a slow, steady weight loss  Even a small amount of weight loss can lower your risk of health problems  How to lose weight safely:  A safe and healthy way to lose weight is to eat fewer calories and get regular exercise  You can lose up about 1 pound a week by decreasing the number of calories you eat by 500 calories each day  Healthy meal plan for weight management:  A healthy meal plan includes a variety of foods, contains fewer calories, and helps you stay healthy   A healthy meal plan includes the following:  · Eat whole-grain foods more often  A healthy meal plan should contain fiber  Fiber is the part of grains, fruits, and vegetables that is not broken down by your body  Whole-grain foods are healthy and provide extra fiber in your diet  Some examples of whole-grain foods are whole-wheat breads and pastas, oatmeal, brown rice, and bulgur  · Eat a variety of vegetables every day  Include dark, leafy greens such as spinach, kale, nury greens, and mustard greens  Eat yellow and orange vegetables such as carrots, sweet potatoes, and winter squash  · Eat a variety of fruits every day  Choose fresh or canned fruit (canned in its own juice or light syrup) instead of juice  Fruit juice has very little or no fiber  · Eat low-fat dairy foods  Drink fat-free (skim) milk or 1% milk  Eat fat-free yogurt and low-fat cottage cheese  Try low-fat cheeses such as mozzarella and other reduced-fat cheeses  · Choose meat and other protein foods that are low in fat  Choose beans or other legumes such as split peas or lentils  Choose fish, skinless poultry (chicken or turkey), or lean cuts of red meat (beef or pork)  Before you cook meat or poultry, cut off any visible fat  · Use less fat and oil  Try baking foods instead of frying them  Add less fat, such as margarine, sour cream, regular salad dressing and mayonnaise to foods  Eat fewer high-fat foods  Some examples of high-fat foods include french fries, doughnuts, ice cream, and cakes  · Eat fewer sweets  Limit foods and drinks that are high in sugar  This includes candy, cookies, regular soda, and sweetened drinks  Exercise:  Exercise at least 30 minutes per day on most days of the week  Some examples of exercise include walking, biking, dancing, and swimming  You can also fit in more physical activity by taking the stairs instead of the elevator or parking farther away from stores  Ask your healthcare provider about the best exercise plan for you        © Copyright [employer] 2018 Information is for End User's use only and may not be sold, redistributed or otherwise used for commercial purposes   All illustrations and images included in CareNotes® are the copyrighted property of A D A M , Inc  or [facility] Froilan Goldman

## 2021-08-16 DIAGNOSIS — J43.9 PULMONARY EMPHYSEMA, UNSPECIFIED EMPHYSEMA TYPE (HCC): ICD-10-CM

## 2021-08-16 RX ORDER — IPRATROPIUM BROMIDE AND ALBUTEROL SULFATE 2.5; .5 MG/3ML; MG/3ML
3 SOLUTION RESPIRATORY (INHALATION) 4 TIMES DAILY
Qty: 1080 ML | Refills: 6 | Status: SHIPPED | OUTPATIENT
Start: 2021-08-16

## 2021-12-17 ENCOUNTER — OFFICE VISIT (OUTPATIENT)
Dept: CARDIOLOGY CLINIC | Facility: CLINIC | Age: 71
End: 2021-12-17
Payer: MEDICARE

## 2021-12-17 VITALS
DIASTOLIC BLOOD PRESSURE: 84 MMHG | OXYGEN SATURATION: 93 % | HEIGHT: 75 IN | BODY MASS INDEX: 30.06 KG/M2 | WEIGHT: 241.8 LBS | SYSTOLIC BLOOD PRESSURE: 152 MMHG | HEART RATE: 57 BPM

## 2021-12-17 DIAGNOSIS — I10 ESSENTIAL HYPERTENSION: ICD-10-CM

## 2021-12-17 DIAGNOSIS — I48.20 CHRONIC A-FIB (HCC): ICD-10-CM

## 2021-12-17 DIAGNOSIS — R06.02 SOB (SHORTNESS OF BREATH): ICD-10-CM

## 2021-12-17 DIAGNOSIS — Z72.0 TOBACCO USE: Primary | ICD-10-CM

## 2021-12-17 DIAGNOSIS — I25.119 CORONARY ARTERY DISEASE INVOLVING NATIVE HEART WITH ANGINA PECTORIS, UNSPECIFIED VESSEL OR LESION TYPE (HCC): ICD-10-CM

## 2021-12-17 DIAGNOSIS — E78.2 MIXED HYPERLIPIDEMIA: ICD-10-CM

## 2021-12-17 PROCEDURE — 99214 OFFICE O/P EST MOD 30 MIN: CPT | Performed by: INTERNAL MEDICINE

## 2021-12-17 RX ORDER — PREDNISONE 10 MG/1
10 TABLET ORAL 2 TIMES DAILY
Qty: 64 TABLET | Refills: 3 | Status: SHIPPED | OUTPATIENT
Start: 2021-12-17 | End: 2021-12-20 | Stop reason: SDUPTHER

## 2021-12-17 RX ORDER — ATORVASTATIN CALCIUM 40 MG/1
40 TABLET, FILM COATED ORAL DAILY
Start: 2021-12-17 | End: 2022-03-25 | Stop reason: SDUPTHER

## 2021-12-20 DIAGNOSIS — R06.02 SOB (SHORTNESS OF BREATH): ICD-10-CM

## 2021-12-20 RX ORDER — PREDNISONE 10 MG/1
10 TABLET ORAL 2 TIMES DAILY
Qty: 64 TABLET | Refills: 3 | Status: SHIPPED | OUTPATIENT
Start: 2021-12-20

## 2022-03-25 ENCOUNTER — OFFICE VISIT (OUTPATIENT)
Dept: CARDIOLOGY CLINIC | Facility: CLINIC | Age: 72
End: 2022-03-25
Payer: MEDICARE

## 2022-03-25 VITALS
WEIGHT: 243.4 LBS | HEIGHT: 75 IN | BODY MASS INDEX: 30.26 KG/M2 | OXYGEN SATURATION: 92 % | DIASTOLIC BLOOD PRESSURE: 88 MMHG | HEART RATE: 93 BPM | SYSTOLIC BLOOD PRESSURE: 152 MMHG

## 2022-03-25 DIAGNOSIS — I25.119 CORONARY ARTERY DISEASE INVOLVING NATIVE HEART WITH ANGINA PECTORIS, UNSPECIFIED VESSEL OR LESION TYPE (HCC): ICD-10-CM

## 2022-03-25 DIAGNOSIS — I10 ESSENTIAL HYPERTENSION: ICD-10-CM

## 2022-03-25 DIAGNOSIS — R06.02 SOB (SHORTNESS OF BREATH): ICD-10-CM

## 2022-03-25 DIAGNOSIS — E78.2 MIXED HYPERLIPIDEMIA: ICD-10-CM

## 2022-03-25 DIAGNOSIS — I65.22 LEFT CAROTID STENOSIS: ICD-10-CM

## 2022-03-25 DIAGNOSIS — I48.20 CHRONIC A-FIB (HCC): Primary | ICD-10-CM

## 2022-03-25 DIAGNOSIS — Z72.0 TOBACCO USE: ICD-10-CM

## 2022-03-25 PROCEDURE — 99214 OFFICE O/P EST MOD 30 MIN: CPT | Performed by: INTERNAL MEDICINE

## 2022-03-25 RX ORDER — ATORVASTATIN CALCIUM 40 MG/1
TABLET, FILM COATED ORAL
Start: 2022-03-25

## 2022-03-25 NOTE — PROGRESS NOTES
[facility]  [phone] [facility]  [place] [address]  Cardiology Follow Up    Radha Duenas  1950  [id number]      1  Chronic a-fib ([facility] )     2  Essential hypertension     3  Coronary artery disease involving native heart with angina pectoris, unspecified vessel or lesion type ([place] [address] )     4  SOB (shortness of breath)     5  Tobacco use         Chief Complaint   Patient presents with    Follow-up     Interval History:  Patient presents for follow-up visit  Patient denies any exertional chest pain  Patient does have shortness of breath related to his COPD  Unfortunately patient continues to smoke in spite of repeated counseling  Patient does have history of atrial fibrillation and had issues with anticoagulation and did not want to be considered for long-term anticoagulation  Patient recently had blood work through primary care physician  Patient's atorvastatin was increased to 40 mg daily  Patient had significant issues related to statin increase  Patient wants know whether he can go back down to 20 mg daily  Patient also has sciatica issues  Patient Active Problem List   Diagnosis    Coronary artery disease with angina pectoris ([place] [address] )    Hypertension    Hyperlipidemia    Tobacco use    Pulmonary emphysema ([place] [address] )    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 ([place] [address] )    New onset atrial fibrillation (HCC)    Bruit of right carotid artery    Paresthesia of right arm    Bilateral carotid artery stenosis     Past Medical History:   Diagnosis Date    Asbestosis ([place] [address] )     Heart murmur     HTN (hypertension)     Hyperlipidemia     Myocardial infarction ([facility] )     SOB (shortness of breath)      Social History     Socioeconomic History    Marital status:       Spouse name: Not on file    Number of children: Not on file    Years of education: Not on file    Highest education level: Not on file   Occupational History    Not on file   Tobacco Use    Smoking status: Current Every Day Smoker     Packs/day: 1 50     Years: 61 00     Pack years: 91 50    Smokeless tobacco: Never Used   Vaping Use    Vaping Use: Never used   Substance and Sexual Activity    Alcohol use: No     Comment: stopped 6 yrs ago    Drug use: No    Sexual activity: Not Currently   Other Topics Concern    Not on file   Social History Narrative        Retired-    Hobbies-playing stock market    3 children    dentures     Social Determinants of Health     Financial Resource Strain: Not on file   Food Insecurity: Not on file   Transportation Needs: Not on file   Physical Activity: Not on file   Stress: Not on file   Social Connections: Not on file   Intimate Partner Violence: Not on file   Housing Stability: Not on file      Family History   Problem Relation Age of Onset    Cancer Mother         unkn type    Dementia Sister     Heart murmur Son      Past Surgical History:   Procedure Laterality Date    CARDIAC CATHETERIZATION      CORONARY ANGIOPLASTY         Current Outpatient Medications:     amLODIPine (NORVASC) 10 mg tablet, Take 1 tablet (10 mg total) by mouth daily, Disp: 90 tablet, Rfl: 3    atorvastatin (LIPITOR) 40 mg tablet, Take 1 tablet (40 mg total) by mouth daily, Disp: , Rfl:     benazepril (LOTENSIN) 20 mg tablet, Take 1 tablet (20 mg total) by mouth daily, Disp: 90 tablet, Rfl: 3    diltiazem (Cartia XT) 300 mg 24 hr capsule, Take 1 capsule (300 mg total) by mouth daily, Disp: 90 capsule, Rfl: 3    ipratropium-albuterol (Combivent Respimat) inhaler, Inhale 2 puffs 3 (three) times a day, Disp: 20 g, Rfl: 3    ipratropium-albuterol (DUO-NEB) 0 5-2 5 mg/3 mL nebulizer solution, Take 3 mL by nebulization 4 (four) times a day, Disp: 1080 mL, Rfl: 6    predniSONE 10 mg tablet, Take 1 tablet (10 mg total) by mouth 2 (two) times a day If needed for shortness of breath and wheezing, Disp: 64 tablet, Rfl: 3    tiotropium-olodaterol (STIOLTO RESPIMAT) 2 5-2 5 MCG/ACT inhaler, Inhale 2 puffs daily (Patient not taking: Reported on 3/25/2022 ), Disp: 1 Inhaler, Rfl: 0  No Known Allergies    Labs:  No visits with results within 2 Month(s) from this visit  Latest known visit with results is:   Hospital Outpatient Visit on 12/20/2018   Component Date Value    Protocol Name 12/20/2018 LEXISCAN-SIT     Time In Exercise Phase 12/20/2018 00:03:00     MAX  SYSTOLIC BP 25/03/0094 694     Max Diastolic Bp 23/01/2165 98     Max Heart Rate 12/20/2018 82     Max Predicted Heart Rate 12/20/2018 150     Reason for Termination 12/20/2018 Protocol Complete     Test Indication 12/20/2018                      Value:Chest Discomfort  CAD  ANGINA      Target Hr Formular 12/20/2018 (220 - Age)*85%      Imaging: No results found  Review of Systems:  Review of Systems   REVIEW OF SYSTEMS:  Constitutional:  Denies fever or chills   Eyes:  Denies change in visual acuity   HENT:  Denies nasal congestion or sore throat   Respiratory:   shortness of breath   Cardiovascular:  Denies chest pain or edema   GI:  Denies abdominal pain, nausea, vomiting, bloody stools or diarrhea   :  Denies dysuria, frequency, difficulty in micturition and nocturia  Musculoskeletal:  DJD  Neurologic:  Denies headache, focal weakness or sensory changes   Endocrine:  Denies polyuria or polydipsia   Lymphatic:  Denies swollen glands   Psychiatric:  Denies depression or anxiety   Physical Exam:    /88 (BP Location: Left arm, Patient Position: Sitting, Cuff Size: Standard)   Pulse 93   Ht 6' 3" (1 905 m)   Wt 110 kg (243 lb 6 4 oz)   SpO2 92%   BMI 30 42 kg/m²     Physical Exam   PHYSICAL EXAM:  General:  Patient is not in acute distress   Head: Normocephalic, Atraumatic  HEENT:  Both pupils normal-size atraumatic, normocephalic, nonicteric  Neck:  JVP not raised   Trachea central  No carotid bruit  Respiratory:  Decreased breath sounds bilateral  Cardiovascular:  Irregularly irregular  GI: Abdomen soft nontender  No organomegaly  Lymphatic:  No cervical or inguinal lymphadenopathy  Neurologic:  Patient is awake alert, oriented   Grossly nonfocal  Extremities no edema     Discussion/Summary:  Patient with multiple medical problems who seems to be doing reasonably well from cardiac standpoint  Previous studies reviewed with patient  Medications reviewed and possible side effects discussed  concepts of cardiovascular disease , signs and symptoms of heart disease  Dietary and risk factor modification reinforced  All questions answered  Safety measures reviewed  Patient advised to report any problems prompting medical attention  Patient has refused consideration for long-term anticoagulation as he had significant issues taking anticoagulation  This is for atrial fibrillation  Patient will stay on atorvastatin 20 milligram daily  Patient does have moderately symptomatic left carotid artery stenosis  Will have a follow-up carotid ultrasound to reassess the same  Patient counseled about hazards of smoking to prevent future cardiovascular events  Patient had a few questions which were answered  Follow-up in 6 months or earlier as needed  Patient is agreeable with the plan of care

## 2022-04-27 ENCOUNTER — TELEPHONE (OUTPATIENT)
Dept: CARDIOLOGY CLINIC | Facility: CLINIC | Age: 72
End: 2022-04-27

## 2022-04-27 NOTE — TELEPHONE ENCOUNTER
Pt said that at his last visit Dr Fany Alicea had recommended a [facility] PCP doct for pt to est w/ & pt cant remember the name

## 2022-05-11 ENCOUNTER — HOSPITAL ENCOUNTER (OUTPATIENT)
Dept: NON INVASIVE DIAGNOSTICS | Facility: CLINIC | Age: 72
Discharge: HOME | End: 2022-05-11
Payer: MEDICARE

## 2022-05-11 DIAGNOSIS — I65.22 LEFT CAROTID STENOSIS: ICD-10-CM

## 2022-05-11 PROCEDURE — 93880 EXTRACRANIAL BILAT STUDY: CPT

## 2022-05-14 PROCEDURE — 93880 EXTRACRANIAL BILAT STUDY: CPT | Performed by: SURGERY

## 2022-08-08 ENCOUNTER — TELEPHONE (OUTPATIENT)
Dept: CARDIOLOGY CLINIC | Facility: CLINIC | Age: 72
End: 2022-08-08

## 2022-08-08 DIAGNOSIS — Z72.0 TOBACCO USE: ICD-10-CM

## 2022-08-08 DIAGNOSIS — J43.9 PULMONARY EMPHYSEMA, UNSPECIFIED EMPHYSEMA TYPE (HCC): Primary | ICD-10-CM

## 2022-08-08 NOTE — TELEPHONE ENCOUNTER
At pt's last ov he had said that Dr Murphy Record referred him to see Dr Guy Leong or Dr Yareli Carter & pt wants to see Dr Guy Leong but her office is req a referral to be put in Epic for pt to see Dr Guy Leong prior to him getting ja

## 2022-09-29 ENCOUNTER — OFFICE VISIT (OUTPATIENT)
Dept: CARDIOLOGY CLINIC | Facility: CLINIC | Age: 72
End: 2022-09-29
Payer: MEDICARE

## 2022-09-29 VITALS
HEART RATE: 63 BPM | WEIGHT: 233 LBS | BODY MASS INDEX: 28.97 KG/M2 | DIASTOLIC BLOOD PRESSURE: 90 MMHG | OXYGEN SATURATION: 95 % | HEIGHT: 75 IN | SYSTOLIC BLOOD PRESSURE: 146 MMHG

## 2022-09-29 DIAGNOSIS — R06.02 SOB (SHORTNESS OF BREATH): ICD-10-CM

## 2022-09-29 DIAGNOSIS — E78.2 MIXED HYPERLIPIDEMIA: ICD-10-CM

## 2022-09-29 DIAGNOSIS — I48.20 CHRONIC A-FIB (HCC): Primary | ICD-10-CM

## 2022-09-29 DIAGNOSIS — I10 ESSENTIAL HYPERTENSION: ICD-10-CM

## 2022-09-29 DIAGNOSIS — I65.22 LEFT CAROTID STENOSIS: ICD-10-CM

## 2022-09-29 DIAGNOSIS — I35.9 AORTIC VALVE DISORDER: ICD-10-CM

## 2022-09-29 DIAGNOSIS — Z72.0 TOBACCO USE: ICD-10-CM

## 2022-09-29 PROCEDURE — 99214 OFFICE O/P EST MOD 30 MIN: CPT | Performed by: INTERNAL MEDICINE

## 2023-01-24 DIAGNOSIS — R06.02 SOB (SHORTNESS OF BREATH): ICD-10-CM

## 2023-01-24 RX ORDER — PREDNISONE 10 MG/1
10 TABLET ORAL 2 TIMES DAILY
Qty: 64 TABLET | Refills: 3 | Status: SHIPPED | OUTPATIENT
Start: 2023-01-24

## 2023-05-12 ENCOUNTER — HOSPITAL ENCOUNTER (OUTPATIENT)
Dept: NON INVASIVE DIAGNOSTICS | Facility: CLINIC | Age: 73
Discharge: HOME | End: 2023-05-12

## 2023-05-12 VITALS
SYSTOLIC BLOOD PRESSURE: 146 MMHG | DIASTOLIC BLOOD PRESSURE: 90 MMHG | WEIGHT: 233 LBS | HEIGHT: 75 IN | BODY MASS INDEX: 28.97 KG/M2 | HEART RATE: 77 BPM

## 2023-05-12 DIAGNOSIS — I35.9 AORTIC VALVE DISORDER: ICD-10-CM

## 2023-05-12 DIAGNOSIS — I65.22 LEFT CAROTID STENOSIS: ICD-10-CM

## 2023-05-12 LAB
AORTIC ROOT: 3.5 CM
AORTIC VALVE MEAN VELOCITY: 26.9 M/S
APICAL FOUR CHAMBER EJECTION FRACTION: 54 %
AV AREA BY CONTINUOUS VTI: 1.3 CM2
AV AREA PEAK VELOCITY: 1.2 CM2
AV LVOT MEAN GRADIENT: 2 MMHG
AV LVOT PEAK GRADIENT: 3 MMHG
AV MEAN GRADIENT: 36 MMHG
AV PEAK GRADIENT: 58 MMHG
AV VALVE AREA: 1.33 CM2
AV VELOCITY RATIO: 0.24
DOP CALC AO PEAK VEL: 3.8 M/S
DOP CALC AO VTI: 66.65 CM
DOP CALC LVOT AREA: 4.91 CM2
DOP CALC LVOT DIAMETER: 2.5 CM
DOP CALC LVOT PEAK VEL VTI: 18.07 CM
DOP CALC LVOT PEAK VEL: 0.9 M/S
DOP CALC LVOT STROKE INDEX: 38 ML/M2
DOP CALC LVOT STROKE VOLUME: 88.66 CM3
FRACTIONAL SHORTENING: 32 % (ref 28–44)
INTERVENTRICULAR SEPTUM IN DIASTOLE (PARASTERNAL SHORT AXIS VIEW): 1.6 CM
INTERVENTRICULAR SEPTUM: 1.6 CM (ref 0.6–1.1)
LAAS-AP2: 27.3 CM2
LAAS-AP4: 25.4 CM2
LEFT ATRIUM AREA SYSTOLE SINGLE PLANE A4C: 23.1 CM2
LEFT ATRIUM SIZE: 5.2 CM
LEFT INTERNAL DIMENSION IN SYSTOLE: 3.2 CM (ref 2.1–4)
LEFT VENTRICULAR INTERNAL DIMENSION IN DIASTOLE: 4.7 CM (ref 3.5–6)
LEFT VENTRICULAR POSTERIOR WALL IN END DIASTOLE: 1.5 CM
LEFT VENTRICULAR STROKE VOLUME: 64 ML
LVSV (TEICH): 64 ML
MITRAL REGURGITATION PEAK VELOCITY: 5.42 M/S
MITRAL VALVE MEAN INFLOW VELOCITY: 4.67 M/S
MITRAL VALVE REGURGITANT PEAK GRADIENT: 118 MMHG
RIGHT ATRIUM AREA SYSTOLE A4C: 16 CM2
RIGHT VENTRICLE ID DIMENSION: 3.8 CM
SL CV DOP CALC MV VTI RETROGRADE: 169.2 CM
SL CV LEFT ATRIUM LENGTH A2C: 7.1 CM
SL CV MV MEAN GRADIENT RETROGRADE: 92 MMHG
SL CV PED ECHO LEFT VENTRICLE DIASTOLIC VOLUME (MOD BIPLANE) 2D: 104 ML
SL CV PED ECHO LEFT VENTRICLE SYSTOLIC VOLUME (MOD BIPLANE) 2D: 40 ML
TR MAX PG: 32 MMHG
TR PEAK VELOCITY: 2.8 M/S
TRICUSPID ANNULAR PLANE SYSTOLIC EXCURSION: 2 CM
TRICUSPID VALVE PEAK REGURGITATION VELOCITY: 2.83 M/S

## 2023-05-15 ENCOUNTER — TELEPHONE (OUTPATIENT)
Dept: CARDIOLOGY CLINIC | Facility: CLINIC | Age: 73
End: 2023-05-15

## 2023-05-15 NOTE — TELEPHONE ENCOUNTER
----- Message from Madisyn Butt MD sent at 5/12/2023  7:09 PM EDT -----  Please call the patient  Carotid ultrasound showed 50 to 69% left ICA stenosis which is unchanged from priors  Echocardiogram however shows normal ejection fraction with moderate to severe aortic stenosis  He has an appointment in the next few days  We will discuss further

## 2023-05-16 ENCOUNTER — OFFICE VISIT (OUTPATIENT)
Dept: CARDIOLOGY CLINIC | Facility: CLINIC | Age: 73
End: 2023-05-16

## 2023-05-16 VITALS
DIASTOLIC BLOOD PRESSURE: 80 MMHG | SYSTOLIC BLOOD PRESSURE: 124 MMHG | HEIGHT: 75 IN | RESPIRATION RATE: 16 BRPM | OXYGEN SATURATION: 96 % | HEART RATE: 92 BPM | BODY MASS INDEX: 28.72 KG/M2 | WEIGHT: 231 LBS

## 2023-05-16 DIAGNOSIS — I65.22 LEFT CAROTID STENOSIS: ICD-10-CM

## 2023-05-16 DIAGNOSIS — E78.2 MIXED HYPERLIPIDEMIA: ICD-10-CM

## 2023-05-16 DIAGNOSIS — I48.20 CHRONIC A-FIB (HCC): Primary | ICD-10-CM

## 2023-05-16 DIAGNOSIS — I35.9 AORTIC VALVE DISORDER: ICD-10-CM

## 2023-05-16 DIAGNOSIS — I10 ESSENTIAL HYPERTENSION: ICD-10-CM

## 2023-05-16 DIAGNOSIS — Z72.0 TOBACCO USE: ICD-10-CM

## 2023-05-16 RX ORDER — ROSUVASTATIN CALCIUM 5 MG/1
5 TABLET, COATED ORAL DAILY
Qty: 30 TABLET | Refills: 5 | Status: SHIPPED | OUTPATIENT
Start: 2023-05-16

## 2023-05-16 RX ORDER — ROSUVASTATIN CALCIUM 5 MG/1
5 TABLET, COATED ORAL DAILY
Qty: 30 TABLET | Refills: 5 | Status: SHIPPED | OUTPATIENT
Start: 2023-05-16 | End: 2023-05-16 | Stop reason: SDUPTHER

## 2023-05-16 NOTE — PROGRESS NOTES
[facility]  [address] [facility] [id number]  Cardiology Follow Up    Eileen Lee  1950  [id number]      1  Chronic a[facility]fib ([facility] )        2  Tobacco use        3  Aortic valve disorder        4  Essential hypertension        5  Left carotid stenosis            Chief Complaint   Patient presents with   • Follow-up       Interval History: Patient presents for follow-up visit  Patient has some shortness of breath related to COPD  No chest pain  No history of leg edema orthopnea PND  Patient does have history of atrial fibrillation and did not want to consider anticoagulation because of excessive bruising and intolerance issues  Patient also has history of asymptomatic moderate left carotid artery stenosis  Patient also has history of aortic stenosis which was mild in the past but moderate to severe by recent echocardiogram   He denies any history of orthopnea PND  He states that he has been compliant all his present medications  He could not tolerate atorvastatin but is willing to try a different statin  Patient Active Problem List   Diagnosis   • Coronary artery disease with angina pectoris ([place] [address] )   • Hypertension   • Hyperlipidemia   • Tobacco use   • Pulmonary emphysema (HCC)   • 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 (HCC)   • New onset atrial fibrillation (HCC)   • Bruit of right carotid artery   • Paresthesia of right arm   • Bilateral carotid artery stenosis     Past Medical History:   Diagnosis Date   • Asbestosis ([place] [address] )    • Heart murmur    • HTN (hypertension)    • Hyperlipidemia    • Myocardial infarction ([place] [address] )    • SOB (shortness of breath)      Social History     Socioeconomic History   • Marital status:       Spouse name: Not on file   • Number of children: Not on file   • Years of education: Not on file   • Highest education level: Not on file   Occupational History   • Not on file   Tobacco Use   • Smoking status: Every Day     Packs/day: 1 50     Years: 61 00     Pack years: 91 50     Types: Cigarettes   • Smokeless tobacco: Never   Vaping Use   • Vaping Use: Never used   Substance and Sexual Activity   • Alcohol use: No     Comment: stopped 6 yrs ago   • Drug use: No   • Sexual activity: Not Currently   Other Topics Concern   • Not on file   Social History Narrative        Retired-    Hobbies-playing stock market    3 children    dentures     Social Determinants of Health     Financial Resource Strain: Not on file   Food Insecurity: Not on file   Transportation Needs: Not on file   Physical Activity: Not on file   Stress: Not on file   Social Connections: Not on file   Intimate Partner Violence: Not on file   Housing Stability: Not on file      Family History   Problem Relation Age of Onset   • Cancer Mother         unkn type   • Dementia Sister    • Heart murmur Son      Past Surgical History:   Procedure Laterality Date   • CARDIAC CATHETERIZATION     • CORONARY ANGIOPLASTY         Current Outpatient Medications:   •  amLODIPine (NORVASC) 10 mg tablet, Take 1 tablet (10 mg total) by mouth daily, Disp: 90 tablet, Rfl: 3  •  benazepril (LOTENSIN) 20 mg tablet, Take 1 tablet (20 mg total) by mouth daily, Disp: 90 tablet, Rfl: 3  •  diltiazem (Cartia XT) 300 mg 24 hr capsule, Take 1 capsule (300 mg total) by mouth daily, Disp: 90 capsule, Rfl: 3  •  ipratropium-albuterol (Combivent Respimat) inhaler, Inhale 2 puffs 3 (three) times a day, Disp: 20 g, Rfl: 3  •  ipratropium-albuterol (DUO-NEB) 0 5-2 5 mg/3 mL nebulizer solution, Take 3 mL by nebulization 4 (four) times a day, Disp: 1080 mL, Rfl: 6  •  predniSONE 10 mg tablet, Take 1 tablet (10 mg total) by mouth 2 (two) times a day If needed for shortness of breath and wheezing, Disp: 64 tablet, Rfl: 3  Allergies   Allergen Reactions   • Atorvastatin Myalgia       Labs:  Hospital Outpatient Visit on 05/12/2023   Component Date Value   • AV area peak didier 05/12/2023 1 2    • MV mean gradient retrogr* 05/12/2023 92    • LA size 05/12/2023 5 2    • Aortic valve mean veloci* 05/12/2023 26 90    • Mitral regurgitation pea* 05/12/2023 5 42    • Mitral valve mean inflow* 05/12/2023 4 67    • Triscuspid Valve Regurgi* 05/12/2023 32 0    • Tricuspid valve peak reg* 05/12/2023 2 83    • LVPWd 05/12/2023 1 50    • Left Atrium Area-systoli* 05/12/2023 27 3    • Left Atrium Area-systoli* 05/12/2023 25 4    • Tricuspid annular plane * 05/12/2023 2 00    • TR Peak Vishal 05/12/2023 2 8    • IVSd 05/12/2023 9 08    • LV DIASTOLIC VOLUME (MOD* 65/28/1485 104    • LEFT VENTRICLE SYSTOLIC * 61/47/7965 40    • Left ventricular stroke * 05/12/2023 64 00    • MV VTI RETROGRADE 05/12/2023 169 2    • A4C EF 05/12/2023 54    • LA length (A2C) 05/12/2023 7 10    • LVIDd 05/12/2023 4 70    • IVS 05/12/2023 1 6    • LVIDS 05/12/2023 3 20    • FS 05/12/2023 32    • Ao root 05/12/2023 3 50    • RVID d 05/12/2023 3 8    • LVOT mn grad 05/12/2023 2 0    • AV area by cont VTI 05/12/2023 1 3    • AV mean gradient 05/12/2023 36 0    • AV LVOT peak gradient 05/12/2023 3    • LVOT diameter 05/12/2023 2 5    • LVOT peak vishal 05/12/2023 0 9    • LVOT peak VTI 05/12/2023 18 07    • Aortic valve peak veloci* 05/12/2023 3 8    • Ao VTI 05/12/2023 66 65    • LVOT stroke volume 05/12/2023 88 66    • AV peak gradient 05/12/2023 58 0    • DAVID A4C 05/12/2023 23 1    • MR PG 05/12/2023 118    • RAA A4C 05/12/2023 16    • LVOT stroke volume index 05/12/2023 38 00    • LVSV, 2D 05/12/2023 64    • LVOT area 05/12/2023 4 91    • DVI 05/12/2023 0 24    • AV valve area 05/12/2023 1 33      Imaging: VAS carotid complete study    Result Date: 5/12/2023  Narrative:  [facility] REPORT [facility]INICAL: Indications: Yearly surveillance of carotid artery disease  Patient is asymptomatic at this time   Operative History: Cardiac angioplasty Risk Factors The patient has history of HTN, Hyperlipidemia, CAD, CHF, AFIB, PIOTR, MI and smoking (current) 1 5 ppd  Clinical Right Pressure: 144/90 mm Hg, Left Pressure: 150/88 mm Hg  FINDINGS:  Right        Impression  PSV  EDV (cm/s)  Direction of Flow  Ratio  Dist  ICA                 56          16                      1 24  Mid  ICA                  57          16                      1 25  Prox  ICA    1 - 49%      51          15                      1 12  Dist CCA                  42           7                            Mid CCA                   45           7                      0 82  Prox CCA                  55          11                            Ext Carotid               72          11                      1 59  Prox Vert                 44          11  Antegrade                 Subclavian               127           0                             Left         Impression  PSV  EDV (cm/s)  Direction of Flow  Ratio  Dist  ICA                 36          13                      0 78  Mid  ICA                  76          11                      1 68  Prox  ICA    50 - 69%    184          41                      4 05  Dist CCA                  37          11                            Mid CCA                   45          15                      0 80  Prox CCA                  56          15                            Ext Carotid               72          10                      1 60  Prox Vert                 48          19  Antegrade                 Subclavian               143           0                               CONCLUSION:  Impression  RIGHT: There is <50% stenosis noted in the internal carotid artery  Plaque is heterogenous and irregular  Vertebral artery flow is antegrade  There is no significant subclavian artery disease  LEFT: There is 50-69% stenosis noted in the internal carotid artery  Plaque is heterogenous, calcified and irregular  Vertebral artery flow is antegrade  There is no significant subclavian artery disease    Compared to previous study on 5/11/2022, there is no "significant progression of disease  Recommend repeat testing in 6 months as per protocol unless otherwise indicated  SIGNATURE: Electronically Signed by: Hawa Carvalho MD on 2023-05-12 51:13:18 PM    Echo complete w/ contrast if indicated    Result Date: 5/12/2023  Narrative: •  Left Ventricle: Left ventricular cavity size is normal  Wall thickness is moderate to severely increased  There is moderate to severe concentric hypertrophy  Systolic function is normal (65%)  Wall motion is normal  Diastolic function is moderately abnormal, consistent with grade II (pseudonormal) relaxation  •  Right Ventricle: Right ventricular cavity size is normal  Systolic function is normal  •  Left Atrium: The atrium is mildly dilated  •  Aortic Valve: There is moderate to severe stenosis  Peak velocity 3 8 m/sec  Peak and mean gradients across the valve were 58 and 36 mm Hg respectively  MAXIMO by the continuity equation method was 1 2 sq cm  This a change from prior  •  Mitral Valve: There is mild regurgitation  •  Tricuspid Valve: There is mild regurgitation   The right ventricular systolic pressure is normal        Review of Systems:  Review of Systems   REVIEW OF SYSTEMS:  Constitutional:  Denies fever or chills   Eyes:  Denies change in visual acuity   HENT:  Denies nasal congestion or sore throat   Respiratory:  shortness of breath   Cardiovascular:  Denies chest pain or edema   GI:  Denies abdominal pain, nausea, vomiting, bloody stools or diarrhea   :  Denies dysuria, frequency, difficulty in micturition and nocturia  Musculoskeletal:  Denies back pain or joint pain   Neurologic:  Denies headache, focal weakness or sensory changes   Endocrine:  Denies polyuria or polydipsia   Lymphatic:  Denies swollen glands   Psychiatric:  Denies depression or anxiety    Physical Exam:    /80 (BP Location: Left arm, Patient Position: Sitting, Cuff Size: Standard)   Pulse 92   Resp 16   Ht 6' 3\" (1 905 m)   Wt 105 kg (231 lb)   " SpO2 96%   BMI 28 87 kg/m²     Physical Exam   PHYSICAL EXAM:  General:  Patient is not in acute distress   Head: Normocephalic, Atraumatic  HEENT:  Both pupils normal-size atraumatic, normocephalic, nonicteric  Neck:  JVP not raised  Trachea central  No carotid bruit  Respiratory: Decreased breath sounds bilateral  Cardiovascular: Irregular  with 3/6 systolic ejection murmur on the right sternal border  GI:  Abdomen soft nontender  No organomegaly  Lymphatic:  No cervical or inguinal lymphadenopathy  Neurologic:  Patient is awake alert, oriented   Grossly nonfocal  Extremities trace edema    Discussion/Summary:    Patient with multiple medical problems who seems to be doing reasonably well from cardiac standpoint  Previous studies reviewed with patient  Medications reviewed and possible side effects discussed  concepts of cardiovascular disease , signs and symptoms of heart disease  Dietary and risk factor modification reinforced  All questions answered  Safety measures reviewed  Patient advised to report any problems prompting medical attention  Patient does have history of atrial fibrillation but has not considered options of anticoagulation citing bleeding concerns as well as intolerance  He did try Xarelto and Eliquis  He did not want to be on Coumadin  Echocardiogram recently showed normal ejection fraction with moderate to severe aortic stenosis  Patient will be scheduled for a repeat limited echocardiogram to reassess aortic stenosis in 6 months followed by appointment  Carotid ultrasound showed 50 to 69% left carotid artery stenosis  This has been unchanged  Symptoms to watch her from cardiac standpoint which would indicate the need for further cardiac evaluation discussed  Follow-up in 6 months or earlier as needed  Follow-up with primary care physician  Patient is agreeable with the plan

## 2023-06-27 ENCOUNTER — TELEPHONE (OUTPATIENT)
Dept: CARDIOLOGY CLINIC | Facility: CLINIC | Age: 73
End: 2023-06-27

## 2023-06-27 NOTE — TELEPHONE ENCOUNTER
Pt was l/s 5/16/23  Pt said that he was put on Crestor & he is having bad reactions from is such as joint px/bruising/not sleeping at night & pt would like to stop it  Pt was prev put on Atorvastatin & got the same reactions   Pt is also ja for an Echo on 12/01/23

## 2023-06-27 NOTE — TELEPHONE ENCOUNTER
Last lipid panel was done on 12/1/21, cholest 175, HDL 63, trgly 70, LDL 96  Alternative to statins?

## 2023-06-27 NOTE — TELEPHONE ENCOUNTER
Patient to stop rosuvastatin this week    To restart rosuvastatin next week and take it only 3 times a week and see how he does  Please update the med list to reflect the same

## 2023-09-26 DIAGNOSIS — R06.02 SOB (SHORTNESS OF BREATH): ICD-10-CM

## 2023-09-26 RX ORDER — PREDNISONE 10 MG/1
10 TABLET ORAL 2 TIMES DAILY
Qty: 180 TABLET | Refills: 3 | Status: SHIPPED | OUTPATIENT
Start: 2023-09-26

## 2023-11-10 DIAGNOSIS — E78.2 MIXED HYPERLIPIDEMIA: ICD-10-CM

## 2023-11-10 RX ORDER — ROSUVASTATIN CALCIUM 5 MG/1
5 TABLET, COATED ORAL DAILY
Qty: 90 TABLET | Refills: 3 | Status: SHIPPED | OUTPATIENT
Start: 2023-11-10

## 2023-12-01 ENCOUNTER — HOSPITAL ENCOUNTER (OUTPATIENT)
Dept: NON INVASIVE DIAGNOSTICS | Facility: CLINIC | Age: 73
Discharge: HOME | End: 2023-12-01
Payer: MEDICARE

## 2023-12-01 VITALS
BODY MASS INDEX: 28.72 KG/M2 | WEIGHT: 231 LBS | SYSTOLIC BLOOD PRESSURE: 124 MMHG | DIASTOLIC BLOOD PRESSURE: 80 MMHG | HEART RATE: 96 BPM | HEIGHT: 75 IN

## 2023-12-01 DIAGNOSIS — I48.20 CHRONIC A-FIB (HCC): ICD-10-CM

## 2023-12-01 DIAGNOSIS — I35.9 AORTIC VALVE DISORDER: ICD-10-CM

## 2023-12-01 LAB
AORTIC ROOT: 3.8 CM
AORTIC VALVE MEAN VELOCITY: 25.6 M/S
APICAL FOUR CHAMBER EJECTION FRACTION: 66 %
AV AREA BY CONTINUOUS VTI: 1.3 CM2
AV AREA PEAK VELOCITY: 1.2 CM2
AV LVOT MEAN GRADIENT: 2 MMHG
AV LVOT PEAK GRADIENT: 3 MMHG
AV MEAN GRADIENT: 30 MMHG
AV PEAK GRADIENT: 53 MMHG
AV VALVE AREA: 1.3 CM2
AV VELOCITY RATIO: 0.25
DOP CALC AO PEAK VEL: 3.64 M/S
DOP CALC AO VTI: 61.29 CM
DOP CALC LVOT AREA: 4.91 CM2
DOP CALC LVOT CARDIAC INDEX: 3.22 L/MIN/M2
DOP CALC LVOT CARDIAC OUTPUT: 7.5 L/MIN
DOP CALC LVOT DIAMETER: 2.5 CM
DOP CALC LVOT PEAK VEL VTI: 16.23 CM
DOP CALC LVOT PEAK VEL: 0.91 M/S
DOP CALC LVOT STROKE INDEX: 33 ML/M2
DOP CALC LVOT STROKE VOLUME: 79.63
E WAVE DECELERATION TIME: 179 MS
FRACTIONAL SHORTENING: 29 (ref 28–44)
INTERVENTRICULAR SEPTUM IN DIASTOLE (PARASTERNAL SHORT AXIS VIEW): 1.6 CM
INTERVENTRICULAR SEPTUM: 1.6 CM (ref 0.6–1.1)
LEFT ATRIUM SIZE: 5.1 CM
LEFT INTERNAL DIMENSION IN SYSTOLE: 3.4 CM (ref 2.1–4)
LEFT VENTRICULAR INTERNAL DIMENSION IN DIASTOLE: 4.8 CM (ref 3.5–6)
LEFT VENTRICULAR POSTERIOR WALL IN END DIASTOLE: 1.8 CM
LEFT VENTRICULAR STROKE VOLUME: 59 ML
LVSV (TEICH): 59 ML
MV E'TISSUE VEL-SEP: 10 CM/S
MV PEAK E VEL: 130 CM/S
MV STENOSIS PRESSURE HALF TIME: 52 MS
MV VALVE AREA P 1/2 METHOD: 4.23
SL CV PED ECHO LEFT VENTRICLE DIASTOLIC VOLUME (MOD BIPLANE) 2D: 106 ML
SL CV PED ECHO LEFT VENTRICLE SYSTOLIC VOLUME (MOD BIPLANE) 2D: 46 ML

## 2023-12-01 PROCEDURE — 93325 DOPPLER ECHO COLOR FLOW MAPG: CPT | Performed by: INTERNAL MEDICINE

## 2023-12-01 PROCEDURE — 93321 DOPPLER ECHO F-UP/LMTD STD: CPT | Performed by: INTERNAL MEDICINE

## 2023-12-01 PROCEDURE — 93321 DOPPLER ECHO F-UP/LMTD STD: CPT

## 2023-12-01 PROCEDURE — 93308 TTE F-UP OR LMTD: CPT | Performed by: INTERNAL MEDICINE

## 2023-12-01 PROCEDURE — 93308 TTE F-UP OR LMTD: CPT

## 2023-12-01 PROCEDURE — 93325 DOPPLER ECHO COLOR FLOW MAPG: CPT

## 2023-12-06 ENCOUNTER — TELEPHONE (OUTPATIENT)
Dept: CARDIOLOGY CLINIC | Facility: CLINIC | Age: 73
End: 2023-12-06

## 2023-12-06 NOTE — TELEPHONE ENCOUNTER
PT called stating Dr. Jovanna Oliver left him a message to call the office and let him know a good time to call the patient is. Patient stated he can be called any time today.

## 2023-12-06 NOTE — TELEPHONE ENCOUNTER
I talked to the patient. I offered cardiac surgery evaluation as outpatient for TAVR consideration. Patient was agreeable. Message was sent to valve coordinator to set up an appointment.

## 2024-01-07 ENCOUNTER — TELEPHONE (OUTPATIENT)
Dept: OTHER | Facility: OTHER | Age: 74
End: 2024-01-07

## 2024-01-07 NOTE — TELEPHONE ENCOUNTER
Patient is calling regarding cancelling an appointment.    Date/Time: 1/8/24 1:00 PM    Patient was rescheduled: YES [] NO [x]    Patient requesting call back to reschedule: YES [x] NO []

## 2024-01-08 ENCOUNTER — OFFICE VISIT (OUTPATIENT)
Dept: CARDIAC SURGERY | Facility: CLINIC | Age: 74
End: 2024-01-08
Payer: MEDICARE

## 2024-01-08 VITALS
SYSTOLIC BLOOD PRESSURE: 171 MMHG | HEART RATE: 100 BPM | BODY MASS INDEX: 28.61 KG/M2 | TEMPERATURE: 98.3 F | WEIGHT: 230.1 LBS | DIASTOLIC BLOOD PRESSURE: 107 MMHG | OXYGEN SATURATION: 96 % | HEIGHT: 75 IN

## 2024-01-08 DIAGNOSIS — I65.22 LEFT CAROTID ARTERY STENOSIS: ICD-10-CM

## 2024-01-08 DIAGNOSIS — I25.119 CORONARY ARTERY DISEASE INVOLVING NATIVE HEART WITH ANGINA PECTORIS, UNSPECIFIED VESSEL OR LESION TYPE (HCC): ICD-10-CM

## 2024-01-08 DIAGNOSIS — I48.20 CHRONIC A-FIB (HCC): ICD-10-CM

## 2024-01-08 DIAGNOSIS — I11.0 BENIGN HYPERTENSIVE HEART DISEASE WITH CONGESTIVE HEART FAILURE (HCC): ICD-10-CM

## 2024-01-08 DIAGNOSIS — I35.0 NONRHEUMATIC AORTIC VALVE STENOSIS: Primary | ICD-10-CM

## 2024-01-08 PROCEDURE — 99204 OFFICE O/P NEW MOD 45 MIN: CPT | Performed by: THORACIC SURGERY (CARDIOTHORACIC VASCULAR SURGERY)

## 2024-01-08 NOTE — LETTER
January 8, 2024     Breonna He MD  [address] [facility] [place] [address]    Patient: Dom Cleary   YOB: 1950   Date of Visit: 1/8/2024       Dear Dr. He:    Thank you for referring Dom Cleary to me for evaluation. Below are my notes for this consultation.    If you have questions, please do not hesitate to call me. I look forward to following your patient along with you.         Sincerely,        Josh Willis, DO        CC: MD Diane Dorman III, PA-C  1/8/2024  2:38 PM  Attested Addendum  Consultation - Cardiothoracic Surgery   Dom Cleary 73 y.o. male MRN: [id number]    Physician Requesting Consult: Dr. He     Reason for Consult / Principal Problem: Aortic stenosis, Non-Rheumatic    History of Present Illness: Dom Cleary is a 73 y.o. year old male who presents for initial outpatient surgical consultation for symptomatic severe aortic stenosis. He has a PMHx AS, COPD, Afib (not on AC due to excessive bleeding/intolerance issues), current tobacco abuse, HLD, CAD s/p MI in 2003 (stent to Left Circumflex). He has been following with Dr. He for several years regarding his CAD, Afib and aortic stenosis. His most recent echocardiogram in December demonstrates now severe aortic stenosis, with mean and peak gradients of 30 and 53 mmHg, peak velocity of 3.64 m/s. As a result, he has been referred for TAVR evaluation.     Upon examination today, patient confirms above details. He also states that he stopped taking most of his medications on 12/31. He only remains taking his ASA, inhalers and prednisone. He states that he is willing to resume taking his medications, but wants to start with one or two at a time. His blood pressure is elevated in the office today, so I did advise resuming his amlodipine and benazapril to start. Symptomatically, he denies chest pain, dizziness, lightheadedness, fatigue, change in activity tolerance,  orthopnea, PND. He does admit to shortness of breath with exertion, but associates this with his COPD. He is a current 1-1.5 ppd smoker, for 61 years. He does not drink alcohol or use recreational drugs. He drives and is independent with ADL's. He is edentulous, and has dentures.     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 He worked as an , and went into the city to volunteer, and unfortunately was exposed to asbestos and other airborne toxins.     Past Medical History:  Past Medical History:   Diagnosis Date   • Asbestosis (HCC)    • Heart murmur    • HTN (hypertension)    • Hyperlipidemia    • Myocardial infarction (HCC)    • SOB (shortness of breath)          Past Surgical History:   Past Surgical History:   Procedure Laterality Date   • CARDIAC CATHETERIZATION     • CORONARY ANGIOPLASTY           Family History:  Family History   Problem Relation Age of Onset   • Cancer Mother         unkn type   • Dementia Sister    • Heart murmur Son          Social History:    Social History     Substance and Sexual Activity   Alcohol Use No    Comment: stopped 6 yrs ago     Social History     Substance and Sexual Activity   Drug Use No     Social History     Tobacco Use   Smoking Status Every Day   • Current packs/day: 1.00   • Average packs/day: 1 pack/day for 61.0 years (61.0 ttl pk-yrs)   • Types: Cigarettes   Smokeless Tobacco Never         Home Medications:   Prior to Admission medications    Medication Sig Start Date End Date Taking? Authorizing Provider   amLODIPine (NORVASC) 10 mg tablet Take 1 tablet (10 mg total) by mouth daily  Patient taking differently: Take 10 mg by mouth daily Has stopped taking on dec 31 8/6/21  Yes Kiko Quintero PA-C   aspirin (ECOTRIN LOW STRENGTH) 81 mg EC tablet Take 81 mg by mouth daily   Yes Historical Provider, MD davisazepril (LOTENSIN) 20 mg tablet Take 1 tablet (20 mg total) by mouth daily  Patient taking differently: Take 20 mg by mouth  daily Stop taking on dec 31 2023 8/6/21  Yes Kiko Quintero PA-C   diltiazem (Cartia XT) 300 mg 24 hr capsule Take 1 capsule (300 mg total) by mouth daily  Patient taking differently: Take 300 mg by mouth daily Has stopped taking on dec 31 2023 8/6/21  Yes Kiko Quintero PA-C   ipratropium-albuterol (Combivent Respimat) inhaler Inhale 2 puffs 3 (three) times a day 7/28/21  Yes Shane Collier MD   ipratropium-albuterol (DUO-NEB) 0.5-2.5 mg/3 mL nebulizer solution Take 3 mL by nebulization 4 (four) times a day 8/16/21  Yes Kiko Quintero PA-C   predniSONE 10 mg tablet Take 1 tablet (10 mg total) by mouth 2 (two) times a day If needed for shortness of breath and wheezing 9/26/23  Yes Breonna He MD   rosuvastatin (CRESTOR) 5 mg tablet Take 1 tablet (5 mg total) by mouth daily  Patient taking differently: Take 5 mg by mouth daily Has stopped on dec 31 11/10/23  Yes Breonna He MD       Allergies:  Allergies   Allergen Reactions   • Atorvastatin Myalgia   • Xarelto [Rivaroxaban] Other (See Comments)     Excessive bruising       Review of Systems:  Review of Systems - History obtained from chart review and the patient  General ROS: negative  Psychological ROS: negative  Ophthalmic ROS: negative  ENT ROS: negative  Allergy and Immunology ROS: negative  Hematological and Lymphatic ROS: positive for - bleeding problems and bruising  Endocrine ROS: negative  Respiratory ROS: positive for - shortness of breath   Cardiovascular ROS: no chest pain or dyspnea on exertion  Gastrointestinal ROS: no abdominal pain, change in bowel habits, or black or bloody stools  Genito-Urinary ROS: no dysuria, trouble voiding, or hematuria  Musculoskeletal ROS: negative  Neurological ROS: no TIA or stroke symptoms  Dermatological ROS: negative    Vital Signs:     Vitals:    01/08/24 1309 01/08/24 1310   BP: (!) 189/103 (!) 171/107   BP Location: Left arm Right arm   Patient Position: Sitting Sitting   Cuff Size: Standard Large   Pulse: 100  "   Temp: 98.3 °F (36.8 °C)    TempSrc: Tympanic    SpO2: 96%    Weight: 104 kg (230 lb 1.6 oz)    Height: 6' 3\" (1.905 m)        Physical Exam:    General: normal appearance, alert, NAD   HEENT/NECK:  PERRL.  No jugular venous distention.    Cardiac:Irregular rhythm, grade 3/6 systolic murmur   Carotid arteries: 1+ bilaterally   Pulmonary: coarse lung sounds bilaterally, inspiratory wheezes  Abdomen:  Non-tender, Non-distended.  Positive bowel sounds.  Upper extremities: 2+ radial pulses; brisk capillary refill  Lower extremities: Extremities warm/dry. PT/DP pulses 2+ bilaterally.  1+ edema B/L  Neuro: Alert and oriented X 3.  Sensation is grossly intact.  No focal deficits.  Musculoskeletal: DRAKE  Skin: Warm/Dry, without rashes or lesions.      Lab Results:               Invalid input(s): \"LABGLOM\"      Lab Results   Component Value Date    HGBA1C 6.2 (H) 12/21/2021     Lab Results   Component Value Date    TROPONINI <0.02 12/11/2018       Imaging Studies:     Echocardiogram: 12/1/23    Left Ventricle Measurements    Function/Volumes   A4C EF 66 %         LVOT stroke volume 79.63         LVOT stroke volume index 33 ml/m2         LVOT Cardiac Output 7.5 l/min         LVOT Cardiac Index 3.22 l/min/m2         Dimensions   LVIDd 4.8 cm         LVIDS 3.4 cm         IVSd 1.6 cm         LVPWd 1.8 cm         LVOT area 4.91 cm2         FS 29         Diastolic Filling   MV E' Tissue Velocity Septal 10 cm/s         E wave deceleration time 179 ms         MV Peak E Vishal 130 cm/s          Report Measurements   AV LVOT peak gradient 3 mmHg              Interventricular Septum Measurements    Shunt Ratio   LVOT peak VTI 16.23 cm         LVOT peak vishal 0.91 m/s              Left Atrium Measurements    Dimensions   LA size 5.1 cm               Atrial Septum Measurements    Shunt Ratio   LVOT peak VTI 16.23 cm         LVOT peak vishal 0.91 m/s               Aortic Valve Measurements    Stenosis   Aortic valve peak velocity 3.64 m/s       "   LVOT peak didier 0.91 m/s         Ao VTI 61.29 cm         LVOT peak VTI 16.23 cm         AV mean gradient 30 mmHg         LVOT mn grad 2 mmHg         AV peak gradient 53 mmHg         AV LVOT peak gradient 3 mmHg         Area/Dimensions   DVI 0.25         AV valve area 1.3 cm2         AV area by cont VTI 1.3 cm2         AV area peak didier 1.2 cm2         LVOT diameter 2.5 cm         LVOT area 4.91 cm2               Mitral Valve Measurements    Stenosis   MV stenosis pressure 1/2 time 52 ms         MV valve area p 1/2 method 4.23               Aorta Measurements      Aortic Dimensions   Ao root 3.8 cm              ECHO 5/12/23  Findings    Left Ventricle Left ventricular cavity size is normal. Wall thickness is moderate to severely increased. There is moderate to severe concentric hypertrophy. Systolic function is normal (65%).  Wall motion is normal. Diastolic function is moderately abnormal, consistent with grade II (pseudonormal) relaxation.   Right Ventricle Right ventricular cavity size is normal. Systolic function is normal. Wall thickness is normal.   Left Atrium The atrium is mildly dilated.   Right Atrium The atrium is normal in size.   Aortic Valve The aortic valve is trileaflet. The leaflets are thickened and calcified and reduced mobility. There is no evidence of regurgitation. There is moderate to severe stenosis. Peak velocity 3.8 m/sec. Peak and mean gradients across the valve were 58 and 36 mm Hg respectively. MAXIMO by the continuity equation method was 1.2 sq cm.   Mitral Valve Mitral valve structure is normal. There is mild regurgitation. There is no evidence of stenosis.   Tricuspid Valve Tricuspid valve structure is normal. There is mild regurgitation. There is no evidence of stenosis. The right ventricular systolic pressure is normal.   Pulmonic Valve Pulmonic valve structure is normal. There is no evidence of regurgitation. There is no evidence of stenosis.   Ascending Aorta The aortic root is normal  in size.   IVC/SVC The inferior vena cava is normal in size.   Pericardium There is no pericardial effusion. The pericardium is normal in appearance.       Carotid Artery Ultrasound: 5/12/23  Impression     RIGHT:  There is <50% stenosis noted in the internal carotid artery. Plaque is  heterogenous and irregular.  Vertebral artery flow is antegrade. There is no significant subclavian artery  disease.     LEFT:  There is 50-69% stenosis noted in the internal carotid artery. Plaque is  heterogenous, calcified and irregular.  Vertebral artery flow is antegrade. There is no significant subclavian artery  disease.     Compared to previous study on 5/11/2022, there is no significant progression of  disease.  Recommend repeat testing in 6 months as per protocol unless otherwise  indicated.    I have personally reviewed pertinent reports.      TAVR evaluation Assessment:     [facility]: II    Aortic Stenosis Stage: D3    5 Meter Walk:   6  7   7     STS risk score (preliminary): 1.3%    KCCQ-12 completed    Assessment:  Patient Active Problem List    Diagnosis Date Noted   • Bilateral carotid artery stenosis 07/27/2021   • Pulmonary emphysema (HCC) 04/02/2021   • 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 (HCC) 04/02/2021   • New onset atrial fibrillation (HCC) 04/02/2021   • Bruit of right carotid artery 04/02/2021   • Paresthesia of right arm 04/02/2021   • Coronary artery disease with angina pectoris ([facility]) 07/05/2018   • Hypertension 07/05/2018   • Hyperlipidemia 07/05/2018   • Tobacco use 07/05/2018     Severe aortic stenosis; Ongoing TAVR workup    Plan:    Dom Cleary has symptomatic severe aortic stenosis. They will undergo the following testing for transcatheter aortic valve replacement: Gated CTA of the chest/abdomen/pelvis, cardiac catheterization and carotid artery ultrasound.      Once these studies have been completed, Dom Cleary will follow up in our office to review the results and to be  evaluated to confirm the suitability of proceeding with transcatheter aortic valve replacment.     Dom Cleary was comfortable with our recommendations, and their questions were answered to their satisfaction.    Thank you for allowing us to participate in the care of this patient.     Routine referral to gastroenterology for colonoscopy screening was not indicated, as the patient is over 73 years old    SIGNATURE: Diane Darby PA-C  DATE: January 8, 2024  TIME: 1:19 PM  Attestation signed by Josh Willis DO at 1/8/2024  2:44 PM:  I supervised the Advanced Practitioner.? I performed, in its entirety, the assessment and plan component of the visit.  I agree with the Advanced Practitioner's note with the following assessment and plan:      Mr. Cleary presents for evaluation of severe aortic stenosis.  TTE was reviewed by me personally.  It shows D3 severe aortic stenosis with a DVI of 0.25 and an LVOT SVI of 33ml/m2.  Based on these findings, I recommend TAVR.  The risks, benefits and alternatives were discussed with patient in detail and he is willing to proceed with pre-op testing.  CTA and left heart cath have been ordered.  He will return to the office once his testing is complete to review results and plan his procedure.    Josh Willis DO 01/08/24

## 2024-01-08 NOTE — PROGRESS NOTES
Consultation - Cardiothoracic Surgery   Dom Cleary 73 y.o. male MRN: [id number]    Physician Requesting Consult: Dr. He     Reason for Consult / Principal Problem: Aortic stenosis, Non-Rheumatic    History of Present Illness: Dom Cleary is a 73 y.o. year old male who presents for initial outpatient surgical consultation for symptomatic severe aortic stenosis. He has a PMHx AS, COPD, Afib (not on AC due to excessive bleeding/intolerance issues), current tobacco abuse, HLD, CAD s/p MI in 2003 (stent to Left Circumflex). He has been following with Dr. He for several years regarding his CAD, Afib and aortic stenosis. His most recent echocardiogram in December demonstrates now severe aortic stenosis, with mean and peak gradients of 30 and 53 mmHg, peak velocity of 3.64 m/s. As a result, he has been referred for TAVR evaluation.     Upon examination today, patient confirms above details. He also states that he stopped taking most of his medications on 12/31. He only remains taking his ASA, inhalers and prednisone. He states that he is willing to resume taking his medications, but wants to start with one or two at a time. His blood pressure is elevated in the office today, so I did advise resuming his amlodipine and benazapril to start. Symptomatically, he denies chest pain, dizziness, lightheadedness, fatigue, change in activity tolerance, orthopnea, PND. He does admit to shortness of breath with exertion, but associates this with his COPD. He is a current 1-1.5 ppd smoker, for 61 years. He does not drink alcohol or use recreational drugs. He drives and is independent with ADL's. He is edentulous, and has dentures.     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 He worked as an , and went into the city to volunteer, and unfortunately was exposed to asbestos and other airborne toxins.     Past Medical History:  Past Medical History:   Diagnosis Date    Asbestosis  (HCC)     Heart murmur     HTN (hypertension)     Hyperlipidemia     Myocardial infarction (HCC)     SOB (shortness of breath)          Past Surgical History:   Past Surgical History:   Procedure Laterality Date    CARDIAC CATHETERIZATION      CORONARY ANGIOPLASTY           Family History:  Family History   Problem Relation Age of Onset    Cancer Mother         unkn type    Dementia Sister     Heart murmur Son          Social History:    Social History     Substance and Sexual Activity   Alcohol Use No    Comment: stopped 6 yrs ago     Social History     Substance and Sexual Activity   Drug Use No     Social History     Tobacco Use   Smoking Status Every Day    Current packs/day: 1.00    Average packs/day: 1 pack/day for 61.0 years (61.0 ttl pk-yrs)    Types: Cigarettes   Smokeless Tobacco Never         Home Medications:   Prior to Admission medications    Medication Sig Start Date End Date Taking? Authorizing Provider   amLODIPine (NORVASC) 10 mg tablet Take 1 tablet (10 mg total) by mouth daily  Patient taking differently: Take 10 mg by mouth daily Has stopped taking on dec 31 8/6/21  Yes Kiko Quintero PA-C   aspirin (ECOTRIN LOW STRENGTH) 81 mg EC tablet Take 81 mg by mouth daily   Yes Historical Provider, MD   benazepril (LOTENSIN) 20 mg tablet Take 1 tablet (20 mg total) by mouth daily  Patient taking differently: Take 20 mg by mouth daily Stop taking on dec 31 2023 8/6/21  Yes Kiko Quintero PA-C   diltiazem (Cartia XT) 300 mg 24 hr capsule Take 1 capsule (300 mg total) by mouth daily  Patient taking differently: Take 300 mg by mouth daily Has stopped taking on dec 31 2023 8/6/21  Yes Kiko Quintero PA-C   ipratropium-albuterol (Combivent Respimat) inhaler Inhale 2 puffs 3 (three) times a day 7/28/21  Yes Shane Collier MD   ipratropium-albuterol (DUO-NEB) 0.5-2.5 mg/3 mL nebulizer solution Take 3 mL by nebulization 4 (four) times a day 8/16/21  Yes Kiko Quintero PA-C   predniSONE 10 mg tablet Take 1 tablet (10 mg  "total) by mouth 2 (two) times a day If needed for shortness of breath and wheezing 9/26/23  Yes Breonna He MD   rosuvastatin (CRESTOR) 5 mg tablet Take 1 tablet (5 mg total) by mouth daily  Patient taking differently: Take 5 mg by mouth daily Has stopped on dec 31 11/10/23  Yes Breonna He MD       Allergies:  Allergies   Allergen Reactions    Atorvastatin Myalgia    Xarelto [Rivaroxaban] Other (See Comments)     Excessive bruising       Review of Systems:  Review of Systems - History obtained from chart review and the patient  General ROS: negative  Psychological ROS: negative  Ophthalmic ROS: negative  ENT ROS: negative  Allergy and Immunology ROS: negative  Hematological and Lymphatic ROS: positive for - bleeding problems and bruising  Endocrine ROS: negative  Respiratory ROS: positive for - shortness of breath   Cardiovascular ROS: no chest pain or dyspnea on exertion  Gastrointestinal ROS: no abdominal pain, change in bowel habits, or black or bloody stools  Genito-Urinary ROS: no dysuria, trouble voiding, or hematuria  Musculoskeletal ROS: negative  Neurological ROS: no TIA or stroke symptoms  Dermatological ROS: negative    Vital Signs:     Vitals:    01/08/24 1309 01/08/24 1310   BP: (!) 189/103 (!) 171/107   BP Location: Left arm Right arm   Patient Position: Sitting Sitting   Cuff Size: Standard Large   Pulse: 100    Temp: 98.3 °F (36.8 °C)    TempSrc: Tympanic    SpO2: 96%    Weight: 104 kg (230 lb 1.6 oz)    Height: 6' 3\" (1.905 m)        Physical Exam:    General: normal appearance, alert, NAD   HEENT/NECK:  PERRL.  No jugular venous distention.    Cardiac:Irregular rhythm, grade 3/6 systolic murmur   Carotid arteries: 1+ bilaterally   Pulmonary: coarse lung sounds bilaterally, inspiratory wheezes  Abdomen:  Non-tender, Non-distended.  Positive bowel sounds.  Upper extremities: 2+ radial pulses; brisk capillary refill  Lower extremities: Extremities warm/dry. PT/DP pulses 2+ bilaterally.  " "1+ edema B/L  Neuro: Alert and oriented X 3.  Sensation is grossly intact.  No focal deficits.  Musculoskeletal: DRAKE  Skin: Warm/Dry, without rashes or lesions.      Lab Results:               Invalid input(s): \"LABGLOM\"      Lab Results   Component Value Date    HGBA1C 6.2 (H) 12/21/2021     Lab Results   Component Value Date    TROPONINI <0.02 12/11/2018       Imaging Studies:     Echocardiogram: 12/1/23    Left Ventricle Measurements    Function/Volumes   A4C EF 66 %         LVOT stroke volume 79.63         LVOT stroke volume index 33 ml/m2         LVOT Cardiac Output 7.5 l/min         LVOT Cardiac Index 3.22 l/min/m2         Dimensions   LVIDd 4.8 cm         LVIDS 3.4 cm         IVSd 1.6 cm         LVPWd 1.8 cm         LVOT area 4.91 cm2         FS 29         Diastolic Filling   MV E' Tissue Velocity Septal 10 cm/s         E wave deceleration time 179 ms         MV Peak E Vishal 130 cm/s          Report Measurements   AV LVOT peak gradient 3 mmHg              Interventricular Septum Measurements    Shunt Ratio   LVOT peak VTI 16.23 cm         LVOT peak vishal 0.91 m/s              Left Atrium Measurements    Dimensions   LA size 5.1 cm               Atrial Septum Measurements    Shunt Ratio   LVOT peak VTI 16.23 cm         LVOT peak vishal 0.91 m/s               Aortic Valve Measurements    Stenosis   Aortic valve peak velocity 3.64 m/s         LVOT peak vishal 0.91 m/s         Ao VTI 61.29 cm         LVOT peak VTI 16.23 cm         AV mean gradient 30 mmHg         LVOT mn grad 2 mmHg         AV peak gradient 53 mmHg         AV LVOT peak gradient 3 mmHg         Area/Dimensions   DVI 0.25         AV valve area 1.3 cm2         AV area by cont VTI 1.3 cm2         AV area peak vishal 1.2 cm2         LVOT diameter 2.5 cm         LVOT area 4.91 cm2               Mitral Valve Measurements    Stenosis   MV stenosis pressure 1/2 time 52 ms         MV valve area p 1/2 method 4.23               Aorta Measurements      Aortic Dimensions "   Ao root 3.8 cm              ECHO 5/12/23  Findings    Left Ventricle Left ventricular cavity size is normal. Wall thickness is moderate to severely increased. There is moderate to severe concentric hypertrophy. Systolic function is normal (65%).  Wall motion is normal. Diastolic function is moderately abnormal, consistent with grade II (pseudonormal) relaxation.   Right Ventricle Right ventricular cavity size is normal. Systolic function is normal. Wall thickness is normal.   Left Atrium The atrium is mildly dilated.   Right Atrium The atrium is normal in size.   Aortic Valve The aortic valve is trileaflet. The leaflets are thickened and calcified and reduced mobility. There is no evidence of regurgitation. There is moderate to severe stenosis. Peak velocity 3.8 m/sec. Peak and mean gradients across the valve were 58 and 36 mm Hg respectively. MAXIMO by the continuity equation method was 1.2 sq cm.   Mitral Valve Mitral valve structure is normal. There is mild regurgitation. There is no evidence of stenosis.   Tricuspid Valve Tricuspid valve structure is normal. There is mild regurgitation. There is no evidence of stenosis. The right ventricular systolic pressure is normal.   Pulmonic Valve Pulmonic valve structure is normal. There is no evidence of regurgitation. There is no evidence of stenosis.   Ascending Aorta The aortic root is normal in size.   IVC/SVC The inferior vena cava is normal in size.   Pericardium There is no pericardial effusion. The pericardium is normal in appearance.       Carotid Artery Ultrasound: 5/12/23  Impression     RIGHT:  There is <50% stenosis noted in the internal carotid artery. Plaque is  heterogenous and irregular.  Vertebral artery flow is antegrade. There is no significant subclavian artery  disease.     LEFT:  There is 50-69% stenosis noted in the internal carotid artery. Plaque is  heterogenous, calcified and irregular.  Vertebral artery flow is antegrade. There is no  significant subclavian artery  disease.     Compared to previous study on 5/11/2022, there is no significant progression of  disease.  Recommend repeat testing in 6 months as per protocol unless otherwise  indicated.    I have personally reviewed pertinent reports.      TAVR evaluation Assessment:     [facility]: II    Aortic Stenosis Stage: D3    5 Meter Walk:   6  7   7     STS risk score (preliminary): 1.3%    KCCQ-12 completed    Assessment:  Patient Active Problem List    Diagnosis Date Noted    Bilateral carotid artery stenosis 07/27/2021    Pulmonary emphysema (HCC) 04/02/2021    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 (HCC) 04/02/2021    New onset atrial fibrillation (HCC) 04/02/2021    Bruit of right carotid artery 04/02/2021    Paresthesia of right arm 04/02/2021    Coronary artery disease with angina pectoris (HCC) 07/05/2018    Hypertension 07/05/2018    Hyperlipidemia 07/05/2018    Tobacco use 07/05/2018     Severe aortic stenosis; Ongoing TAVR workup    Plan:    Dom Cleary has symptomatic severe aortic stenosis. They will undergo the following testing for transcatheter aortic valve replacement: Gated CTA of the chest/abdomen/pelvis, cardiac catheterization and carotid artery ultrasound.      Once these studies have been completed, Dom Cleary will follow up in our office to review the results and to be evaluated to confirm the suitability of proceeding with transcatheter aortic valve replacment.     Dom Cleary was comfortable with our recommendations, and their questions were answered to their satisfaction.    Thank you for allowing us to participate in the care of this patient.     Routine referral to gastroenterology for colonoscopy screening was not indicated, as the patient is over 73 years old    SIGNATURE: Diane Darby PA-C  DATE: January 8, 2024  TIME: 1:19 PM

## 2024-01-11 ENCOUNTER — OFFICE VISIT (OUTPATIENT)
Dept: CARDIOLOGY CLINIC | Facility: CLINIC | Age: 74
End: 2024-01-11
Payer: MEDICARE

## 2024-01-11 VITALS
DIASTOLIC BLOOD PRESSURE: 84 MMHG | WEIGHT: 226 LBS | OXYGEN SATURATION: 95 % | SYSTOLIC BLOOD PRESSURE: 135 MMHG | BODY MASS INDEX: 28.1 KG/M2 | RESPIRATION RATE: 18 BRPM | HEIGHT: 75 IN | HEART RATE: 95 BPM

## 2024-01-11 DIAGNOSIS — Z72.0 TOBACCO USE: ICD-10-CM

## 2024-01-11 DIAGNOSIS — I48.20 CHRONIC A-FIB (HCC): Primary | ICD-10-CM

## 2024-01-11 DIAGNOSIS — I35.9 AORTIC VALVE DISORDER: ICD-10-CM

## 2024-01-11 DIAGNOSIS — I10 ESSENTIAL HYPERTENSION: ICD-10-CM

## 2024-01-11 DIAGNOSIS — I25.119 CORONARY ARTERY DISEASE INVOLVING NATIVE HEART WITH ANGINA PECTORIS, UNSPECIFIED VESSEL OR LESION TYPE (HCC): ICD-10-CM

## 2024-01-11 PROCEDURE — 99214 OFFICE O/P EST MOD 30 MIN: CPT | Performed by: INTERNAL MEDICINE

## 2024-01-11 NOTE — PROGRESS NOTES
[facility]  [address] [id number]  Cardiology Follow Up    Dom Cleary  1950  [id number]      1. Chronic a-fib (HCC)        2. Aortic valve disorder        3. Essential hypertension        4. Coronary artery disease involving native heart with angina pectoris, unspecified vessel or lesion type (HCC)            Chief Complaint   Patient presents with    Follow-up       Interval History:   This patient has history of hypertension as well as hyperlipidemia and COPD and history of moderate asymptomatic carotid artery stenosis.  Patient was found to have severe aortic stenosis by recent echocardiogram.  Patient was evaluated by cardiac surgery and was recommended workup prior to TAVR which included carotid ultrasound, CT of the chest abdomen and pelvis and cardiac catheterization.    Patient has a CT of the chest abdomen and pelvis scheduled later this month but rest of the workup has not been scheduled.  Patient states that he does not want to do anything till May of this year and is not very sure whether he wants to proceed with TAVR.  He also stopped a lot of his medications on his own.  Patient does have tendency for bruisability.  Patient has history of atrial fibrillation but declined consideration for anticoagulation citing bleeding risks and side effects.    Patient Active Problem List   Diagnosis    Coronary artery disease with angina pectoris (HCC)    Hypertension    Hyperlipidemia    Tobacco use    Pulmonary emphysema (HCC)    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 (HCC)    New onset atrial fibrillation (HCC)    Bruit of right carotid artery    Paresthesia of right arm    Bilateral carotid artery stenosis     Past Medical History:   Diagnosis Date    Asbestosis (HCC)     Heart murmur     HTN (hypertension)     Hyperlipidemia     Myocardial infarction (HCC)     SOB (shortness of breath)      Social History     Socioeconomic History     Marital status:      Spouse name: Not on file    Number of children: Not on file    Years of education: Not on file    Highest education level: Not on file   Occupational History    Not on file   Tobacco Use    Smoking status: Every Day     Current packs/day: 1.00     Average packs/day: 1 pack/day for 61.0 years (61.0 ttl pk-yrs)     Types: Cigarettes    Smokeless tobacco: Never   Vaping Use    Vaping status: Never Used   Substance and Sexual Activity    Alcohol use: No     Comment: stopped 6 yrs ago    Drug use: No    Sexual activity: Not Currently   Other Topics Concern    Not on file   Social History Narrative        Retired-    Hobbies-playing stock market    3 children    dentures     Social Determinants of Health     Financial Resource Strain: Not on file   Food Insecurity: Not on file   Transportation Needs: Not on file   Physical Activity: Not on file   Stress: Not on file   Social Connections: Not on file   Intimate Partner Violence: Not on file   Housing Stability: Not on file      Family History   Problem Relation Age of Onset    Cancer Mother         unkn type    Dementia Sister     Heart murmur Son      Past Surgical History:   Procedure Laterality Date    CARDIAC CATHETERIZATION      CORONARY ANGIOPLASTY         Current Outpatient Medications:     amLODIPine (NORVASC) 10 mg tablet, Take 1 tablet (10 mg total) by mouth daily (Patient taking differently: Take 10 mg by mouth daily Has stopped taking on dec 31), Disp: 90 tablet, Rfl: 3    aspirin (ECOTRIN LOW STRENGTH) 81 mg EC tablet, Take 81 mg by mouth daily, Disp: , Rfl:     benazepril (LOTENSIN) 20 mg tablet, Take 1 tablet (20 mg total) by mouth daily (Patient taking differently: Take 20 mg by mouth daily Stop taking on dec 31 2023), Disp: 90 tablet, Rfl: 3    diltiazem (Cartia XT) 300 mg 24 hr capsule, Take 1 capsule (300 mg total) by mouth daily (Patient taking differently: Take 300 mg by mouth daily Has stopped taking on  dec 31 2023), Disp: 90 capsule, Rfl: 3    ipratropium-albuterol (Combivent Respimat) inhaler, Inhale 2 puffs 3 (three) times a day, Disp: 20 g, Rfl: 3    ipratropium-albuterol (DUO-NEB) 0.5-2.5 mg/3 mL nebulizer solution, Take 3 mL by nebulization 4 (four) times a day, Disp: 1080 mL, Rfl: 6    predniSONE 10 mg tablet, Take 1 tablet (10 mg total) by mouth 2 (two) times a day If needed for shortness of breath and wheezing, Disp: 180 tablet, Rfl: 3    rosuvastatin (CRESTOR) 5 mg tablet, Take 1 tablet (5 mg total) by mouth daily (Patient taking differently: Take 5 mg by mouth daily Has stopped on dec 31), Disp: 90 tablet, Rfl: 3  Allergies   Allergen Reactions    Atorvastatin Myalgia    Xarelto [Rivaroxaban] Other (See Comments)     Excessive bruising       Labs:  Hospital Outpatient Visit on 12/01/2023   Component Date Value    MV stenosis pressure 1/2* 12/01/2023 52     MV Peak E Vishal 12/01/2023 130     AV peak gradient 12/01/2023 53     LVOT stroke volume 12/01/2023 79.63     Ao VTI 12/01/2023 61.29     Aortic valve peak veloci* 12/01/2023 3.64     LVOT peak VTI 12/01/2023 16.23     LVOT peak vishal 12/01/2023 0.91     LVOT diameter 12/01/2023 2.5     E wave deceleration time 12/01/2023 179     MV valve area p 1/2 meth* 12/01/2023 4.23     AV LVOT peak gradient 12/01/2023 3     AV mean gradient 12/01/2023 30     AV area peak vishal 12/01/2023 1.2     AV area by cont VTI 12/01/2023 1.3     LVOT mn grad 12/01/2023 2.0     A4C EF 12/01/2023 66     Aortic valve mean veloci* 12/01/2023 25.60     Left ventricular stroke * 12/01/2023 59.00     IVSd 12/01/2023 1.60     Ao root 12/01/2023 3.80     LVPWd 12/01/2023 1.80     LA size 12/01/2023 5.1     FS 12/01/2023 29     LVIDS 12/01/2023 3.40     IVS 12/01/2023 1.6     LVIDd 12/01/2023 4.80     LEFT VENTRICLE SYSTOLIC * 12/01/2023 46     LV DIASTOLIC VOLUME (MOD* 12/01/2023 106     LVOT Cardiac Index 12/01/2023 3.22     LVOT stroke volume index 12/01/2023 33.00     LVOT Cardiac  "Output 12/01/2023 7.50     MV E' Tissue Velocity Se* 12/01/2023 10     LVSV, 2D 12/01/2023 59     LVOT area 12/01/2023 4.91     DVI 12/01/2023 0.25     AV valve area 12/01/2023 1.30      Imaging: No results found.    Review of Systems:  Review of Systems  REVIEW OF SYSTEMS:  Constitutional:  Denies fever or chills   Eyes:  Denies change in visual acuity   HENT:  Denies nasal congestion or sore throat   Respiratory:   shortness of breath   Cardiovascular:  Denies chest pain or edema   GI:  Denies abdominal pain, nausea, vomiting, bloody stools or diarrhea   :  Denies dysuria, frequency, difficulty in micturition and nocturia  Musculoskeletal:  Denies back pain or joint pain   Neurologic:  Denies headache, focal weakness or sensory changes   Endocrine:  Denies polyuria or polydipsia   Lymphatic:  Denies swollen glands   Psychiatric:  Denies depression or anxiety    Physical Exam:    /84 (BP Location: Left arm, Patient Position: Sitting, Cuff Size: Standard)   Pulse 95   Resp 18   Ht 6' 3\" (1.905 m)   Wt 103 kg (226 lb)   SpO2 95%   BMI 28.25 kg/m²     Physical Exam  PHYSICAL EXAM:  General:  Patient is not in acute distress   Head: Normocephalic, Atraumatic.  HEENT:  Both pupils normal-size atraumatic, normocephalic, nonicteric  Neck:  JVP not raised. Trachea central.  carotid bruit  Respiratory: Decreased breath sounds bilateral with scattered rhonchi  Cardiovascular: Irregularly irregular with 3/6 systolic ejection murmur in the right sternal border.  GI:  Abdomen soft nontender. No organomegaly.   Lymphatic:  No cervical or inguinal lymphadenopathy  Neurologic:  Patient is awake alert, oriented . Grossly nonfocal  Extremities no edema    Recent echocardiogram showed normal ejection fraction with severe aortic stenosis.  A copy of this report has been provided to patient at his request.    Discussion/Summary:    This patient has history of multiple medical problems including hypertension, " hyperlipidemia as well as COPD and emphysema.  Patient has history of atrial fibrillation and has refused consideration for anticoagulation citing bleeding risks as well as side effects.  Patient understands the risks of thromboembolic events with atrial fibrillation.    As far as severe aortic stenosis is concerned, patient was evaluated by cardiac surgery and was recommended workup which includes carotid ultrasound, CT of the chest abdomen and pelvis as well as cardiac catheterization.    Patient has been recommended to proceed with the workup and schedule TAVR through cardiac surgery.  Patient has additional history of noncompliance and he is not sure whether he wants to go through with TAVR.    Etiology and pathogenesis of aortic stenosis in general discussed with patient.  Patient also counseled about hazards of smoking to prevent future cardiovascular events.

## 2024-01-16 ENCOUNTER — TELEPHONE (OUTPATIENT)
Dept: CARDIAC SURGERY | Facility: CLINIC | Age: 74
End: 2024-01-16

## 2024-01-16 NOTE — TELEPHONE ENCOUNTER
Patient returned called, stated he has decided against TAVR at this time.  Made him aware we will cancel appointments and make providers aware.  Stated understanding and thanked me for the call

## 2024-01-16 NOTE — TELEPHONE ENCOUNTER
Call placed to patient to confirm appointments for CTA/ Carotid ultrasound on January 25th at 9am and 10am respectively, no answer LMOM, asked to return call to confirm these.

## 2024-01-18 ENCOUNTER — TELEPHONE (OUTPATIENT)
Dept: CARDIOLOGY CLINIC | Facility: CLINIC | Age: 74
End: 2024-01-18

## 2024-01-18 NOTE — TELEPHONE ENCOUNTER
Patient to stay with rosuvastatin    Discontinue atorvastatin.    Definitely cannot be taking both.

## 2024-01-18 NOTE — TELEPHONE ENCOUNTER
Amlodipine and benazepril are for blood pressure.    They can be taken together.    There is in fact a combination medication called Lotrel which has amlodipine and benazepril together.

## 2024-01-18 NOTE — TELEPHONE ENCOUNTER
Spoke with pt and Dr. WILCOX's message was relayed at length. Pt verbally understood to continue his meds as prescribed.

## 2024-01-18 NOTE — TELEPHONE ENCOUNTER
Pt walked into office . He understood he should only be on rosuvastatin.     Pt stated he is not taking amlodipine. He said he hasn't taken it in over a year due to a [facility] research that said amlodipine and benezapril should not be taking together. Please advise.

## 2024-01-18 NOTE — TELEPHONE ENCOUNTER
Pt walked into office. He stated he is taking atorvastatin 20 mg daily and he is also taking rosuvastatin 5 mg daily. He said [facility] was prescribing his atorvastatin but have discontinued it with his [facility] mail order . Pt stated he is taking both and feels fine still gets bruising. Pt would like to know if he should continue both or which one should he be on. He said if he is to take atorvastatin that [facility] should be sending scripts to mail order. Pt wants to discuss meds with Dr. LOU

## 2024-01-19 NOTE — TELEPHONE ENCOUNTER
Pt came into office upset. He wants his CTA chest abdomen cancelled because it was ordered by Dr. Willis. Pt does not want to get a TAVR. He is not interested in the way it is to be performed by Dr. Willis. He wants to speak with Dr. He. He stated he wants a different Provider for the procedure. He stated if Dr. WILCOX wants him to get the CTA he should order it and pt will get done. Pt is requesting to speak with Dr. WILCOX over the phone or a office visit. Please advise.

## 2024-01-23 NOTE — TELEPHONE ENCOUNTER
Based on my discussion with him in the office    He was not sure about TAVR at all.    If he is not sure about TAVR, he does not need to go through with CT of the chest and abdomen.  He can cancel it.      If he is considering TAVR, I can send a message to valve coordinator if they can get a different provider or he can get another opinion at [facility].    He needs to be sure what he wants done or not done.

## 2024-01-24 NOTE — TELEPHONE ENCOUNTER
Spoke with patient and he stated he not going to do TAVR .    The CT of the chest and Abdomen was canceled .    He would like an appt with Dr BRENDEN taylor to discuss further.

## 2024-01-25 ENCOUNTER — OFFICE VISIT (OUTPATIENT)
Dept: CARDIOLOGY CLINIC | Facility: CLINIC | Age: 74
End: 2024-01-25
Payer: MEDICARE

## 2024-01-25 VITALS
HEIGHT: 75 IN | DIASTOLIC BLOOD PRESSURE: 80 MMHG | SYSTOLIC BLOOD PRESSURE: 136 MMHG | WEIGHT: 227 LBS | BODY MASS INDEX: 28.23 KG/M2 | HEART RATE: 66 BPM | OXYGEN SATURATION: 95 % | RESPIRATION RATE: 16 BRPM

## 2024-01-25 DIAGNOSIS — Z72.0 TOBACCO USE: ICD-10-CM

## 2024-01-25 DIAGNOSIS — E78.2 MIXED HYPERLIPIDEMIA: ICD-10-CM

## 2024-01-25 DIAGNOSIS — I10 ESSENTIAL HYPERTENSION: ICD-10-CM

## 2024-01-25 DIAGNOSIS — I25.119 CORONARY ARTERY DISEASE INVOLVING NATIVE HEART WITH ANGINA PECTORIS, UNSPECIFIED VESSEL OR LESION TYPE (HCC): ICD-10-CM

## 2024-01-25 DIAGNOSIS — I48.20 CHRONIC A-FIB (HCC): Primary | ICD-10-CM

## 2024-01-25 DIAGNOSIS — I35.9 AORTIC VALVE DISORDER: ICD-10-CM

## 2024-01-25 PROCEDURE — 99214 OFFICE O/P EST MOD 30 MIN: CPT | Performed by: INTERNAL MEDICINE

## 2024-01-25 RX ORDER — ROSUVASTATIN CALCIUM 5 MG/1
TABLET, COATED ORAL
Start: 2024-01-25

## 2024-01-26 NOTE — PROGRESS NOTES
[facility]  [address] [id number]  Cardiology Follow Up    Dom Cleary  1950  [id number]      1. Chronic a-fib (HCC)        2. Aortic valve disorder        3. Coronary artery disease involving native heart with angina pectoris, unspecified vessel or lesion type (HCC)        4. Tobacco use        5. Essential hypertension            Chief Complaint   Patient presents with    Follow-up       Interval History:   This patient presents for follow-up visit to discuss TAVR options.  Patient was seen by cardiac surgery and was supposed to get workup.  Patient states that he lives alone and has no social support.  His granddaughter will be moving from [place] to this area sometime in June.  He wants to hold off on any workup for TAVR till that time.  Patient was having elevated blood pressures and he is back on amlodipine with better blood pressures.  Patient's rosuvastatin was changed to every other day by primary care physician as he had some concerns about side effects.  Patient is unable to take beta-blockers for control of A-fib and hence on Cardizem.  Patient has done well on combination of amlodipine and Cardizem without any side effects at this time.  He understands the concerns with interaction and blood pressure lowering effect which has been discussed.  Patient continues to smoke.  Patient does have history of COPD followed by primary.    Patient Active Problem List   Diagnosis    Coronary artery disease with angina pectoris (HCC)    Hypertension    Hyperlipidemia    Tobacco use    Pulmonary emphysema (HCC)    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 (HCC)    New onset atrial fibrillation (HCC)    Bruit of right carotid artery    Paresthesia of right arm    Bilateral carotid artery stenosis     Past Medical History:   Diagnosis Date    Asbestosis (HCC)     Heart murmur     HTN (hypertension)     Hyperlipidemia     Myocardial infarction ([facility])      SOB (shortness of breath)      Social History     Socioeconomic History    Marital status:      Spouse name: Not on file    Number of children: Not on file    Years of education: Not on file    Highest education level: Not on file   Occupational History    Not on file   Tobacco Use    Smoking status: Every Day     Current packs/day: 1.00     Average packs/day: 1 pack/day for 61.0 years (61.0 ttl pk-yrs)     Types: Cigarettes    Smokeless tobacco: Never   Vaping Use    Vaping status: Never Used   Substance and Sexual Activity    Alcohol use: No     Comment: stopped 6 yrs ago    Drug use: No    Sexual activity: Not Currently   Other Topics Concern    Not on file   Social History Narrative        Retired-    Hobbies-playing stock market    3 children    dentures     Social Determinants of Health     Financial Resource Strain: Not on file   Food Insecurity: Not on file   Transportation Needs: Not on file   Physical Activity: Not on file   Stress: Not on file   Social Connections: Not on file   Intimate Partner Violence: Not on file   Housing Stability: Not on file      Family History   Problem Relation Age of Onset    Cancer Mother         unkn type    Dementia Sister     Heart murmur Son      Past Surgical History:   Procedure Laterality Date    CARDIAC CATHETERIZATION      CORONARY ANGIOPLASTY         Current Outpatient Medications:     amLODIPine (NORVASC) 10 mg tablet, Take 1 tablet (10 mg total) by mouth daily (Patient taking differently: Take 10 mg by mouth daily Has stopped taking on dec 31), Disp: 90 tablet, Rfl: 3    aspirin (ECOTRIN LOW STRENGTH) 81 mg EC tablet, Take 81 mg by mouth daily, Disp: , Rfl:     diltiazem (Cartia XT) 300 mg 24 hr capsule, Take 1 capsule (300 mg total) by mouth daily (Patient taking differently: Take 300 mg by mouth daily Has stopped taking on dec 31 2023), Disp: 90 capsule, Rfl: 3    predniSONE 10 mg tablet, Take 1 tablet (10 mg total) by mouth 2 (two) times  I have personally evaluated and examined the patient. The Attending was available to me as a supervising provider if needed. a day If needed for shortness of breath and wheezing, Disp: 180 tablet, Rfl: 3    rosuvastatin (CRESTOR) 5 mg tablet, Take 1 tablet (5 mg total) by mouth daily (Patient taking differently: Take 5 mg by mouth daily Has stopped on dec 31), Disp: 90 tablet, Rfl: 3    benazepril (LOTENSIN) 20 mg tablet, Take 1 tablet (20 mg total) by mouth daily (Patient not taking: Reported on 1/25/2024), Disp: 90 tablet, Rfl: 3    ipratropium-albuterol (Combivent Respimat) inhaler, Inhale 2 puffs 3 (three) times a day (Patient not taking: Reported on 1/25/2024), Disp: 20 g, Rfl: 3    ipratropium-albuterol (DUO-NEB) 0.5-2.5 mg/3 mL nebulizer solution, Take 3 mL by nebulization 4 (four) times a day, Disp: 1080 mL, Rfl: 6  Allergies   Allergen Reactions    Atorvastatin Myalgia    Xarelto [Rivaroxaban] Other (See Comments)     Excessive bruising       Labs:  Hospital Outpatient Visit on 12/01/2023   Component Date Value    MV stenosis pressure 1/2* 12/01/2023 52     MV Peak E Vishal 12/01/2023 130     AV peak gradient 12/01/2023 53     LVOT stroke volume 12/01/2023 79.63     Ao VTI 12/01/2023 61.29     Aortic valve peak veloci* 12/01/2023 3.64     LVOT peak VTI 12/01/2023 16.23     LVOT peak vishal 12/01/2023 0.91     LVOT diameter 12/01/2023 2.5     E wave deceleration time 12/01/2023 179     MV valve area p 1/2 meth* 12/01/2023 4.23     AV LVOT peak gradient 12/01/2023 3     AV mean gradient 12/01/2023 30     AV area peak vishal 12/01/2023 1.2     AV area by cont VTI 12/01/2023 1.3     LVOT mn grad 12/01/2023 2.0     A4C EF 12/01/2023 66     Aortic valve mean veloci* 12/01/2023 25.60     Left ventricular stroke * 12/01/2023 59.00     IVSd 12/01/2023 1.60     Ao root 12/01/2023 3.80     LVPWd 12/01/2023 1.80     LA size 12/01/2023 5.1     FS 12/01/2023 29     LVIDS 12/01/2023 3.40     IVS 12/01/2023 1.6     LVIDd 12/01/2023 4.80     LEFT VENTRICLE SYSTOLIC * 12/01/2023 46     LV DIASTOLIC VOLUME (MOD* 12/01/2023 106     LVOT Cardiac Index  "12/01/2023 3.22     LVOT stroke volume index 12/01/2023 33.00     LVOT Cardiac Output 12/01/2023 7.50     MV E' Tissue Velocity Se* 12/01/2023 10     LVSV, 2D 12/01/2023 59     LVOT area 12/01/2023 4.91     DVI 12/01/2023 0.25     AV valve area 12/01/2023 1.30      Imaging: No results found.    Review of Systems:  Review of Systems  REVIEW OF SYSTEMS:  Constitutional:  Denies fever or chills   Eyes:  Denies change in visual acuity   HENT:  Denies nasal congestion or sore throat   Respiratory:  shortness of breath   Cardiovascular:  Denies chest pain or edema   GI:  Denies abdominal pain, nausea, vomiting, bloody stools or diarrhea   :  Denies dysuria, frequency, difficulty in micturition and nocturia  Musculoskeletal:  Denies back pain or joint pain   Neurologic:  Denies headache, focal weakness or sensory changes   Endocrine:  Denies polyuria or polydipsia   Lymphatic:  Denies swollen glands   Psychiatric:  Denies depression or anxiety    Physical Exam:    /80 (BP Location: Left arm, Patient Position: Sitting, Cuff Size: Standard)   Pulse 66   Resp 16   Ht 6' 3\" (1.905 m)   Wt 103 kg (227 lb)   SpO2 95%   BMI 28.37 kg/m²     Physical Exam  PHYSICAL EXAM:  General:  Patient is not in acute distress   Head: Normocephalic, Atraumatic.  HEENT:  Both pupils normal-size atraumatic, normocephalic, nonicteric  Neck:  JVP not raised. Trachea central. No carotid bruit  Respiratory: Scattered rhonchi  Cardiovascular: Irregularly irregular with 3/6 systolic ejection murmur in the right sternal border  GI:  Abdomen soft nontender. No organomegaly.   Lymphatic:  No cervical or inguinal lymphadenopathy  Neurologic:  Patient is awake alert, oriented . Grossly nonfocal  Extremities no edema    Discussion/Summary:    Patient has known history of hypertension as well as chronic atrial fibrillation.  Patient has been tried on different anticoagulants and patient had major concerns with bleeding and has stopped " them.    Patient has bruising all over the body of unclear etiology.  He is not on anything except baby aspirin.  Patient has been recommended to get a hematology evaluation through his primary care physician.    As regards TAVR evaluation, patient is categorical that he does not want to do anything at this time regarding TAVR workup till June when his granddaughter will be in the area to help him during that time.    Patient counseled about smoking.  Patient has been instructed to report any symptoms out of the ordinary.  He had concerns regarding some of his medications which were discussed.

## 2024-09-04 ENCOUNTER — TELEPHONE (OUTPATIENT)
Dept: CARDIOLOGY CLINIC | Facility: CLINIC | Age: 74
End: 2024-09-04

## 2024-09-04 NOTE — TELEPHONE ENCOUNTER
Regarding inhalers and rescue inhalers, I would defer this to his primary care physician.    The PCP can certainly discuss with me regarding which one he or she plans to prescribe.

## 2024-09-04 NOTE — TELEPHONE ENCOUNTER
Pt is currently on Combient for COPD & w/ allergies & humidity, his bp gets high while on it  Pt was wondering if there is another rescue inhaler that he can take  If so, pt would like for it to get sent to [facility]  Pt would also like a call back letting him know the recommendation

## 2024-09-04 NOTE — TELEPHONE ENCOUNTER
[facility]  FAMILY MEDICINE OFFICE VISIT    Chief Complaint   Patient presents with   • Physical     CPE       ASSESSMENT/PLAN     Routine physical examination  (primary encounter diagnosis)  Chronic cough         1. Routine physical examination  Age and gender appropriate screenings were discussed in detail and screening recommendations per the USPSTF were discussed.  Vaccine recommendations per CDC guidelines were reviewed.  [place] paper filled.  - COMPREHENSIVE METABOLIC PANEL; Future  - CBC WITH DIFFERENTIAL; Future  - LIPID PANEL WITH REFLEX; Future    2. Chronic cough  Unclear etiology, evaluated by Pulmonology and ENT. Encouraged to schedule CT chest. Will trial gabapentin. If no relief after 1 month, will discontinue. If partial relief, then can increase dose after 1 month to a max of 900 mg BID.  - gabapentin (NEURONTIN) 300 MG capsule; Take 1 capsule by mouth daily. Indications: Chronic Cough  Dispense: 30 capsule; Refill: 0  - COMPREHENSIVE METABOLIC PANEL; Future  - CBC WITH DIFFERENTIAL; Future      Return in about 1 year (around 12/12/2023), or if symptoms worsen or fail to improve, for CPE.           GUMARO ROBBINS Winter is a 41 year old male who presents today for routine physical. Concerns include chronic cough. He has seen Pulmonology earlier this year. Asthma was ruled out with negative methacholine challenge test. He tried pantoprazole 40 mg BID without relief. Went to ENT and had unremarkable endoscopy of throat to rule out vocal cord dysfunction. He states he has been tracking his cough, and gets about 12 episodes a day. Denies any other symptoms.    REVIEW OF SYSTEMS:  As above.    Relevant medications and allergies, past medical, surgical, social and family history were reviewed.    OBJECTIVE     Visit Vitals  /82 (BP Location: LUE - Left upper extremity, Patient Position: Sitting, Cuff Size: Regular)   Pulse (!) 54   Temp 97.2 °F (36.2 °C) (Temporal)  JOSE ALFREDO relaying Dr. He's response.     Ht 6' 4\" (1.93 m)   Wt 111.5 kg (245 lb 12.8 oz)   SpO2 98%   BMI 29.92 kg/m²     GENERAL:  Well developed, well nourished, no acute distress, non-toxic appearance.   HEENT:  Atraumatic, external ears normal.  Neck- Normal range of motion, supple.  Eyes:  Conjunctivae normal.  Extraocular motions intact.   CARDIOVASCULAR:  Regular, rate and rhythm without murmur, rubs, or gallops.  RESPIRATORY:   Normal respiratory effort.  CTA.  No wheezes, rales or rhonchi.  GASTROINTESTINAL:  Soft and non tender.  No hepatomegaly or splenomegaly.  No guarding or rebound tenderness.  No masses.  MUSCULOSKELETAL:  No deformities.  SKIN:  Well hydrated, no rash.   NEUROLOGICAL:  Alert and oriented x3.  Cranial nerves II-XII grossly intact.  Normal motor function.  PSYCHIATRIC:  Speech and behavior appropriate.

## 2024-09-04 NOTE — TELEPHONE ENCOUNTER
Called pt to give the office a call back to discuss any questions or concerns regarding BP being high and also if pt has any BP readings.

## 2024-09-27 ENCOUNTER — TELEPHONE (OUTPATIENT)
Dept: OTHER | Facility: OTHER | Age: 74
End: 2024-09-27

## 2024-09-27 NOTE — TELEPHONE ENCOUNTER
(Pt's daughter was very upset and distraught about the report of her father passing. Upset towards office and clinical team)    Pt's daughter was offered return phone call from office.    Pt's daughter declined return phone from office.